# Patient Record
Sex: FEMALE | Race: ASIAN | NOT HISPANIC OR LATINO | Employment: UNEMPLOYED
[De-identification: names, ages, dates, MRNs, and addresses within clinical notes are randomized per-mention and may not be internally consistent; named-entity substitution may affect disease eponyms.]

---

## 2024-03-19 LAB
ABO + RH BLD: NORMAL
HBV SURFACE AG SER QL: NEGATIVE
HIV 1+2 AB+HIV1 P24 AG SERPL QL IA: NON REACTIVE
RPR SER QL: NON REACTIVE
RUBV IGG SERPL IA-ACNC: NORMAL

## 2024-05-03 ENCOUNTER — EXTERNAL LAB (OUTPATIENT)
Dept: HEALTH INFORMATION MANAGEMENT | Facility: OTHER | Age: 29
End: 2024-05-03

## 2024-05-03 ENCOUNTER — APPOINTMENT (OUTPATIENT)
Dept: LAB | Age: 29
End: 2024-05-03

## 2024-05-03 DIAGNOSIS — Z34.01 ENCOUNTER FOR SUPERVISION OF NORMAL FIRST PREGNANCY, FIRST TRIMESTER (CMD): Primary | ICD-10-CM

## 2024-05-03 LAB
BKR KIT TESTING DISCLAIMER STATEMENT: NORMAL
PANORAMA  PRENATAL SCREEN SUMMARY: NORMAL

## 2024-05-03 PROCEDURE — 36415 COLL VENOUS BLD VENIPUNCTURE: CPT | Performed by: CLINICAL MEDICAL LABORATORY

## 2024-05-31 ENCOUNTER — EXTERNAL LAB (OUTPATIENT)
Dept: HEALTH INFORMATION MANAGEMENT | Facility: OTHER | Age: 29
End: 2024-05-31

## 2024-05-31 LAB — LAB RESULT: NORMAL

## 2024-07-15 ENCOUNTER — CLINICAL ABSTRACT (OUTPATIENT)
Dept: MATERNAL FETAL MEDICINE | Age: 29
End: 2024-07-15

## 2024-07-15 DIAGNOSIS — Z36.89 ENCOUNTER FOR FETAL ANATOMIC SURVEY (CMD): Primary | ICD-10-CM

## 2024-07-15 DIAGNOSIS — Z36.89 ENCOUNTER FOR ULTRASOUND TO ASSESS FETAL GROWTH (CMD): ICD-10-CM

## 2024-08-06 ENCOUNTER — APPOINTMENT (OUTPATIENT)
Dept: MATERNAL FETAL MEDICINE | Age: 29
End: 2024-08-06
Attending: OBSTETRICS & GYNECOLOGY

## 2024-08-12 ENCOUNTER — OFFICE VISIT (OUTPATIENT)
Dept: MATERNAL FETAL MEDICINE | Age: 29
End: 2024-08-12
Attending: OBSTETRICS & GYNECOLOGY

## 2024-08-12 DIAGNOSIS — Z36.89 ENCOUNTER FOR ULTRASOUND TO ASSESS FETAL GROWTH (CMD): ICD-10-CM

## 2024-08-12 DIAGNOSIS — O35.BXX1 ECHOGENIC FOCUS OF HEART OF FETUS AFFECTING ANTEPARTUM CARE OF MOTHER, FETUS 1 OF MULTIPLE GESTATION (CMD): Primary | ICD-10-CM

## 2024-08-12 DIAGNOSIS — Z36.89 ENCOUNTER FOR FETAL ANATOMIC SURVEY (CMD): ICD-10-CM

## 2024-08-12 PROCEDURE — 76811 OB US DETAILED SNGL FETUS: CPT

## 2024-08-26 LAB
HIV 1+2 AB+HIV1 P24 AG SERPL QL IA: NON REACTIVE
RPR SER QL: NON REACTIVE

## 2024-10-19 ENCOUNTER — ANESTHESIA (OUTPATIENT)
Dept: OBGYN | Age: 29
End: 2024-10-19

## 2024-10-19 ENCOUNTER — HOSPITAL ENCOUNTER (INPATIENT)
Age: 29
LOS: 3 days | Discharge: HOME | End: 2024-10-22
Attending: OBSTETRICS & GYNECOLOGY | Admitting: OBSTETRICS & GYNECOLOGY

## 2024-10-19 ENCOUNTER — ANESTHESIA EVENT (OUTPATIENT)
Dept: OBGYN | Age: 29
End: 2024-10-19

## 2024-10-19 DIAGNOSIS — Z98.891 S/P C-SECTION: Primary | ICD-10-CM

## 2024-10-19 LAB
ABO + RH BLD: NORMAL
ALBUMIN SERPL-MCNC: 2.7 G/DL (ref 3.4–5)
ALBUMIN/GLOB SERPL: 0.7 {RATIO} (ref 1–2.4)
ALP SERPL-CCNC: 278 UNITS/L (ref 45–117)
ALT SERPL-CCNC: 108 UNITS/L
ANION GAP SERPL CALC-SCNC: 13 MMOL/L (ref 7–19)
AST SERPL-CCNC: 80 UNITS/L
BASOPHILS # BLD: 0 K/MCL (ref 0–0.3)
BASOPHILS NFR BLD: 1 %
BILIRUB SERPL-MCNC: 0.5 MG/DL (ref 0.2–1)
BLD GP AB SCN SERPL QL GEL: NEGATIVE
BUN SERPL-MCNC: 10 MG/DL (ref 6–20)
BUN/CREAT SERPL: 19 (ref 7–25)
CALCIUM SERPL-MCNC: 9.2 MG/DL (ref 8.4–10.2)
CHLORIDE SERPL-SCNC: 109 MMOL/L (ref 97–110)
CO2 SERPL-SCNC: 20 MMOL/L (ref 21–32)
CREAT SERPL-MCNC: 0.53 MG/DL (ref 0.51–0.95)
CREAT UR-MCNC: 30.1 MG/DL
DEPRECATED RDW RBC: 44.2 FL (ref 39–50)
EGFRCR SERPLBLD CKD-EPI 2021: >90 ML/MIN/{1.73_M2}
EOSINOPHIL # BLD: 0.1 K/MCL (ref 0–0.5)
EOSINOPHIL NFR BLD: 1 %
ERYTHROCYTE [DISTWIDTH] IN BLOOD: 16 % (ref 11–15)
FASTING DURATION TIME PATIENT: ABNORMAL H
FIBRINOGEN PPP-MCNC: 726 MG/DL (ref 190–425)
GLOBULIN SER-MCNC: 4 G/DL (ref 2–4)
GLUCOSE SERPL-MCNC: 95 MG/DL (ref 70–99)
HCT VFR BLD CALC: 40.6 % (ref 36–46.5)
HGB BLD-MCNC: 13.1 G/DL (ref 12–15.5)
IMM GRANULOCYTES # BLD AUTO: 0.1 K/MCL (ref 0–0.2)
IMM GRANULOCYTES # BLD: 1 %
LYMPHOCYTES # BLD: 2.5 K/MCL (ref 1–4.8)
LYMPHOCYTES NFR BLD: 28 %
MCH RBC QN AUTO: 24.9 PG (ref 26–34)
MCHC RBC AUTO-ENTMCNC: 32.3 G/DL (ref 32–36.5)
MCV RBC AUTO: 77.2 FL (ref 78–100)
MONOCYTES # BLD: 0.5 K/MCL (ref 0.3–0.9)
MONOCYTES NFR BLD: 6 %
NEUTROPHILS # BLD: 5.6 K/MCL (ref 1.8–7.7)
NEUTROPHILS NFR BLD: 63 %
NRBC BLD MANUAL-RTO: 0 /100 WBC
PLATELET # BLD AUTO: 303 K/MCL (ref 140–450)
POTASSIUM SERPL-SCNC: 4.3 MMOL/L (ref 3.4–5.1)
PROT SERPL-MCNC: 6.7 G/DL (ref 6.4–8.2)
PROT UR-MCNC: 12 MG/DL
PROT/CREAT UR: 399 MGPR/GCR
RBC # BLD: 5.26 MIL/MCL (ref 4–5.2)
RPR SER QL: NONREACTIVE
SODIUM SERPL-SCNC: 138 MMOL/L (ref 135–145)
TYPE AND SCREEN EXPIRATION DATE: NORMAL
WBC # BLD: 8.8 K/MCL (ref 4.2–11)

## 2024-10-19 PROCEDURE — 10006023 HB SUPPLY 272: Performed by: OBSTETRICS & GYNECOLOGY

## 2024-10-19 PROCEDURE — 10002807 HB RX 258

## 2024-10-19 PROCEDURE — 13000141 HB OB COMPLEX CASE EACH ADD MINUTE: Performed by: OBSTETRICS & GYNECOLOGY

## 2024-10-19 PROCEDURE — 86900 BLOOD TYPING SEROLOGIC ABO: CPT | Performed by: OBSTETRICS & GYNECOLOGY

## 2024-10-19 PROCEDURE — 13003286 HB ROOM CHARGE L&D

## 2024-10-19 PROCEDURE — 10002803 HB RX 637

## 2024-10-19 PROCEDURE — 13000140 HB OB COMPLEX  CASE S/U + 1ST 15 MIN: Performed by: OBSTETRICS & GYNECOLOGY

## 2024-10-19 PROCEDURE — 10002801 HB RX 250 W/O HCPCS: Performed by: OBSTETRICS & GYNECOLOGY

## 2024-10-19 PROCEDURE — 36415 COLL VENOUS BLD VENIPUNCTURE: CPT | Performed by: OBSTETRICS & GYNECOLOGY

## 2024-10-19 PROCEDURE — 82570 ASSAY OF URINE CREATININE: CPT | Performed by: OBSTETRICS & GYNECOLOGY

## 2024-10-19 PROCEDURE — 85025 COMPLETE CBC W/AUTO DIFF WBC: CPT | Performed by: OBSTETRICS & GYNECOLOGY

## 2024-10-19 PROCEDURE — 10002801 HB RX 250 W/O HCPCS

## 2024-10-19 PROCEDURE — 86592 SYPHILIS TEST NON-TREP QUAL: CPT | Performed by: OBSTETRICS & GYNECOLOGY

## 2024-10-19 PROCEDURE — 96372 THER/PROPH/DIAG INJ SC/IM: CPT | Performed by: OBSTETRICS & GYNECOLOGY

## 2024-10-19 PROCEDURE — 13001455 HB PERINATAL CARE HIGH RISK

## 2024-10-19 PROCEDURE — 80053 COMPREHEN METABOLIC PANEL: CPT | Performed by: OBSTETRICS & GYNECOLOGY

## 2024-10-19 PROCEDURE — 85384 FIBRINOGEN ACTIVITY: CPT | Performed by: OBSTETRICS & GYNECOLOGY

## 2024-10-19 PROCEDURE — 10002807 HB RX 258: Performed by: OBSTETRICS & GYNECOLOGY

## 2024-10-19 PROCEDURE — 10002800 HB RX 250 W HCPCS: Performed by: OBSTETRICS & GYNECOLOGY

## 2024-10-19 PROCEDURE — 13000012 HB ANESTHESIA SPINAL/EPIDURAL IN L&D: Performed by: OBSTETRICS & GYNECOLOGY

## 2024-10-19 PROCEDURE — 10002800 HB RX 250 W HCPCS

## 2024-10-19 PROCEDURE — 13000001 HB PHASE II RECOVERY EA 30 MINUTES: Performed by: OBSTETRICS & GYNECOLOGY

## 2024-10-19 RX ORDER — 0.9 % SODIUM CHLORIDE 0.9 %
2 VIAL (ML) INJECTION EVERY 12 HOURS SCHEDULED
Status: CANCELLED | OUTPATIENT
Start: 2024-10-20

## 2024-10-19 RX ORDER — HEPARIN SOD,PORK IN 0.45% NACL 25000/500
INTRAVENOUS SOLUTION INTRAVENOUS CONTINUOUS
Status: CANCELLED | OUTPATIENT
Start: 2024-10-19

## 2024-10-19 RX ORDER — OXYTOCIN/0.9 % SODIUM CHLORIDE 30/500 ML
0-20 PLASTIC BAG, INJECTION (ML) INTRAVENOUS CONTINUOUS
Status: DISCONTINUED | OUTPATIENT
Start: 2024-10-19 | End: 2024-10-20

## 2024-10-19 RX ORDER — METHYLERGONOVINE MALEATE 0.2 MG/ML
200 INJECTION INTRAVENOUS
Status: DISCONTINUED | OUTPATIENT
Start: 2024-10-19 | End: 2024-10-20

## 2024-10-19 RX ORDER — CARBOPROST TROMETHAMINE 250 UG/ML
250 INJECTION, SOLUTION INTRAMUSCULAR PRN
Status: DISCONTINUED | OUTPATIENT
Start: 2024-10-19 | End: 2024-10-20

## 2024-10-19 RX ORDER — HEPARIN SOD,PORK IN 0.45% NACL 25000/500
INTRAVENOUS SOLUTION INTRAVENOUS CONTINUOUS
Status: DISCONTINUED | OUTPATIENT
Start: 2024-10-19 | End: 2024-10-20

## 2024-10-19 RX ORDER — BUPIVACAINE HYDROCHLORIDE 2.5 MG/ML
INJECTION, SOLUTION EPIDURAL; INFILTRATION; INTRACAUDAL
Status: COMPLETED | OUTPATIENT
Start: 2024-10-19 | End: 2024-10-19

## 2024-10-19 RX ORDER — EPHEDRINE SULFATE/0.9% NACL/PF 50 MG/10ML
10 SYRINGE (ML) INTRAVENOUS
Status: DISCONTINUED | OUTPATIENT
Start: 2024-10-19 | End: 2024-10-20

## 2024-10-19 RX ORDER — CALCIUM CARBONATE 500 MG/1
500 TABLET, CHEWABLE ORAL EVERY 4 HOURS PRN
Status: DISCONTINUED | OUTPATIENT
Start: 2024-10-19 | End: 2024-10-20

## 2024-10-19 RX ORDER — MORPHINE SULFATE 1 MG/ML
INJECTION, SOLUTION EPIDURAL; INTRATHECAL; INTRAVENOUS PRN
Status: DISCONTINUED | OUTPATIENT
Start: 2024-10-19 | End: 2024-10-19

## 2024-10-19 RX ORDER — LIDOCAINE HYDROCHLORIDE 10 MG/ML
30 INJECTION, SOLUTION EPIDURAL; INFILTRATION; INTRACAUDAL; PERINEURAL
Status: DISCONTINUED | OUTPATIENT
Start: 2024-10-19 | End: 2024-10-20

## 2024-10-19 RX ORDER — HEPARIN SOD,PORK IN 0.45% NACL 25000/500
INTRAVENOUS SOLUTION INTRAVENOUS
Status: COMPLETED
Start: 2024-10-19 | End: 2024-10-19

## 2024-10-19 RX ORDER — BUPIVACAINE HYDROCHLORIDE 2.5 MG/ML
30 INJECTION, SOLUTION EPIDURAL; INFILTRATION; INTRACAUDAL ONCE
Status: CANCELLED | OUTPATIENT
Start: 2024-10-19 | End: 2024-10-19

## 2024-10-19 RX ORDER — OXYTOCIN-SODIUM CHLORIDE 0.9% IV SOLN 30 UNIT/500ML 30-0.9/5 UT/ML-%
0-334 SOLUTION INTRAVENOUS CONTINUOUS
Status: DISCONTINUED | OUTPATIENT
Start: 2024-10-19 | End: 2024-10-20

## 2024-10-19 RX ORDER — NALOXONE HCL 0.4 MG/ML
0.1 VIAL (ML) INJECTION PRN
Status: CANCELLED | OUTPATIENT
Start: 2024-10-19 | End: 2024-10-20

## 2024-10-19 RX ORDER — MISOPROSTOL 200 UG/1
800 TABLET ORAL
Status: DISCONTINUED | OUTPATIENT
Start: 2024-10-19 | End: 2024-10-20

## 2024-10-19 RX ORDER — EPHEDRINE SULFATE/0.9% NACL/PF 50 MG/10ML
5 SYRINGE (ML) INTRAVENOUS
Status: CANCELLED | OUTPATIENT
Start: 2024-10-19

## 2024-10-19 RX ORDER — SODIUM CHLORIDE, SODIUM LACTATE, POTASSIUM CHLORIDE, CALCIUM CHLORIDE 600; 310; 30; 20 MG/100ML; MG/100ML; MG/100ML; MG/100ML
INJECTION, SOLUTION INTRAVENOUS CONTINUOUS PRN
Status: DISCONTINUED | OUTPATIENT
Start: 2024-10-19 | End: 2024-10-19

## 2024-10-19 RX ORDER — CEFAZOLIN 2 G/1
INJECTION, POWDER, FOR SOLUTION INTRAMUSCULAR; INTRAVENOUS
Status: COMPLETED
Start: 2024-10-19 | End: 2024-10-19

## 2024-10-19 RX ORDER — ONDANSETRON 4 MG/1
4 TABLET, ORALLY DISINTEGRATING ORAL EVERY 12 HOURS PRN
Status: CANCELLED | OUTPATIENT
Start: 2024-10-19

## 2024-10-19 RX ORDER — 0.9 % SODIUM CHLORIDE 0.9 %
10 VIAL (ML) INJECTION PRN
Status: CANCELLED | OUTPATIENT
Start: 2024-10-19

## 2024-10-19 RX ORDER — LIDOCAINE HYDROCHLORIDE AND EPINEPHRINE 15; 5 MG/ML; UG/ML
INJECTION, SOLUTION EPIDURAL PRN
Status: DISCONTINUED | OUTPATIENT
Start: 2024-10-19 | End: 2024-10-19

## 2024-10-19 RX ORDER — ONDANSETRON 2 MG/ML
4 INJECTION INTRAMUSCULAR; INTRAVENOUS 2 TIMES DAILY PRN
Status: DISCONTINUED | OUTPATIENT
Start: 2024-10-19 | End: 2024-10-20

## 2024-10-19 RX ORDER — SODIUM CHLORIDE, SODIUM LACTATE, POTASSIUM CHLORIDE, CALCIUM CHLORIDE 600; 310; 30; 20 MG/100ML; MG/100ML; MG/100ML; MG/100ML
INJECTION, SOLUTION INTRAVENOUS CONTINUOUS
Status: DISCONTINUED | OUTPATIENT
Start: 2024-10-19 | End: 2024-10-20

## 2024-10-19 RX ORDER — BETAMETHASONE SODIUM PHOSPHATE AND BETAMETHASONE ACETATE 3; 3 MG/ML; MG/ML
12 INJECTION, SUSPENSION INTRA-ARTICULAR; INTRALESIONAL; INTRAMUSCULAR; SOFT TISSUE EVERY 24 HOURS
Status: DISCONTINUED | OUTPATIENT
Start: 2024-10-19 | End: 2024-10-20

## 2024-10-19 RX ORDER — TRANEXAMIC ACID 10 MG/ML
1000 INJECTION, SOLUTION INTRAVENOUS EVERY 30 MIN PRN
Status: DISCONTINUED | OUTPATIENT
Start: 2024-10-19 | End: 2024-10-20

## 2024-10-19 RX ORDER — OXYTOCIN 10 [USP'U]/ML
10 INJECTION, SOLUTION INTRAMUSCULAR; INTRAVENOUS
Status: DISCONTINUED | OUTPATIENT
Start: 2024-10-19 | End: 2024-10-20

## 2024-10-19 RX ORDER — ONDANSETRON 2 MG/ML
4 INJECTION INTRAMUSCULAR; INTRAVENOUS EVERY 12 HOURS PRN
Status: CANCELLED | OUTPATIENT
Start: 2024-10-19

## 2024-10-19 RX ORDER — EPHEDRINE SULFATE/0.9% NACL/PF 50 MG/10ML
10 SYRINGE (ML) INTRAVENOUS
Status: CANCELLED | OUTPATIENT
Start: 2024-10-19

## 2024-10-19 RX ORDER — ONDANSETRON 2 MG/ML
INJECTION INTRAMUSCULAR; INTRAVENOUS PRN
Status: DISCONTINUED | OUTPATIENT
Start: 2024-10-19 | End: 2024-10-19

## 2024-10-19 RX ORDER — EPHEDRINE SULFATE/0.9% NACL/PF 50 MG/10ML
5 SYRINGE (ML) INTRAVENOUS
Status: DISCONTINUED | OUTPATIENT
Start: 2024-10-19 | End: 2024-10-20

## 2024-10-19 RX ORDER — DIPHENHYDRAMINE HYDROCHLORIDE 50 MG/ML
25 INJECTION INTRAMUSCULAR; INTRAVENOUS EVERY 4 HOURS PRN
Status: DISCONTINUED | OUTPATIENT
Start: 2024-10-19 | End: 2024-10-20

## 2024-10-19 RX ORDER — PANTOPRAZOLE SODIUM 40 MG/1
40 TABLET, DELAYED RELEASE ORAL DAILY
Status: ON HOLD | COMMUNITY
End: 2024-10-22 | Stop reason: HOSPADM

## 2024-10-19 RX ORDER — LANOLIN ALCOHOL/MO/W.PET/CERES
1000 CREAM (GRAM) TOPICAL DAILY
COMMUNITY

## 2024-10-19 RX ORDER — BUPIVACAINE HYDROCHLORIDE 2.5 MG/ML
30 INJECTION, SOLUTION EPIDURAL; INFILTRATION; INTRACAUDAL ONCE
Status: DISCONTINUED | OUTPATIENT
Start: 2024-10-19 | End: 2024-10-20

## 2024-10-19 RX ORDER — FAMOTIDINE 20 MG/1
20 TABLET, FILM COATED ORAL 2 TIMES DAILY
Status: ON HOLD | COMMUNITY
End: 2024-10-22 | Stop reason: HOSPADM

## 2024-10-19 RX ORDER — NITROGLYCERIN 400 UG/1
SPRAY ORAL
Status: DISCONTINUED
Start: 2024-10-19 | End: 2024-10-19 | Stop reason: WASHOUT

## 2024-10-19 RX ORDER — VITAMIN A ACETATE, BETA CAROTENE, ASCORBIC ACID, CHOLECALCIFEROL, .ALPHA.-TOCOPHEROL ACETATE, DL-, THIAMINE MONONITRATE, RIBOFLAVIN, NIACINAMIDE, PYRIDOXINE HYDROCHLORIDE, FOLIC ACID, CYANOCOBALAMIN, CALCIUM CARBONATE, FERROUS FUMARATE, ZINC OXIDE, CUPRIC OXIDE 3080; 12; 120; 400; 1; 1.84; 3; 20; 22; 920; 25; 200; 27; 10; 2 [IU]/1; UG/1; MG/1; [IU]/1; MG/1; MG/1; MG/1; MG/1; MG/1; [IU]/1; MG/1; MG/1; MG/1; MG/1; MG/1
1 TABLET, FILM COATED ORAL DAILY
COMMUNITY

## 2024-10-19 RX ADMIN — ONDANSETRON 4 MG: 2 INJECTION INTRAMUSCULAR; INTRAVENOUS at 22:54

## 2024-10-19 RX ADMIN — BUPIVACAINE HYDROCHLORIDE 10 ML: 2.5 INJECTION, SOLUTION EPIDURAL; INFILTRATION; INTRACAUDAL at 08:18

## 2024-10-19 RX ADMIN — Medication 10 ML/HR: at 08:25

## 2024-10-19 RX ADMIN — OXYTOCIN 334 MILLI-UNITS/MIN: 10 INJECTION, SOLUTION INTRAMUSCULAR; INTRAVENOUS at 22:57

## 2024-10-19 RX ADMIN — Medication 200 MCG: at 17:02

## 2024-10-19 RX ADMIN — FENTANYL CITRATE 100 MCG: 50 INJECTION INTRAMUSCULAR; INTRAVENOUS at 08:22

## 2024-10-19 RX ADMIN — AMPICILLIN 1000 MG: 1 INJECTION, POWDER, FOR SOLUTION INTRAMUSCULAR; INTRAVENOUS at 21:05

## 2024-10-19 RX ADMIN — SODIUM CHLORIDE, POTASSIUM CHLORIDE, SODIUM LACTATE AND CALCIUM CHLORIDE: 600; 310; 30; 20 INJECTION, SOLUTION INTRAVENOUS at 03:58

## 2024-10-19 RX ADMIN — Medication 2 MILLI-UNITS/MIN: at 05:37

## 2024-10-19 RX ADMIN — AMPICILLIN 2000 MG: 2 INJECTION, POWDER, FOR SOLUTION INTRAMUSCULAR; INTRAVENOUS at 04:00

## 2024-10-19 RX ADMIN — MORPHINE SULFATE 3.5 MG: 1 INJECTION, SOLUTION EPIDURAL; INTRATHECAL; INTRAVENOUS at 23:10

## 2024-10-19 RX ADMIN — AMPICILLIN 1000 MG: 1 INJECTION, POWDER, FOR SOLUTION INTRAMUSCULAR; INTRAVENOUS at 08:40

## 2024-10-19 RX ADMIN — FENTANYL CITRATE 50 MCG: 50 INJECTION INTRAMUSCULAR; INTRAVENOUS at 22:43

## 2024-10-19 RX ADMIN — AMPICILLIN 1000 MG: 1 INJECTION, POWDER, FOR SOLUTION INTRAMUSCULAR; INTRAVENOUS at 17:02

## 2024-10-19 RX ADMIN — HYDROMORPHONE HYDROCHLORIDE 0.2 MG: 1 INJECTION, SOLUTION INTRAMUSCULAR; INTRAVENOUS; SUBCUTANEOUS at 06:50

## 2024-10-19 RX ADMIN — BETAMETHASONE SODIUM PHOSPHATE AND BETAMETHASONE ACETATE 12 MG: 3; 3 INJECTION, SUSPENSION INTRA-ARTICULAR; INTRALESIONAL; INTRAMUSCULAR at 04:01

## 2024-10-19 RX ADMIN — LIDOCAINE HYDROCHLORIDE,EPINEPHRINE BITARTRATE 8 ML: 15; .005 INJECTION, SOLUTION EPIDURAL; INFILTRATION; INTRACAUDAL; PERINEURAL at 22:45

## 2024-10-19 RX ADMIN — SODIUM CHLORIDE, SODIUM LACTATE, POTASSIUM CHLORIDE, AND CALCIUM CHLORIDE: .6; .31; .03; .02 INJECTION, SOLUTION INTRAVENOUS at 22:37

## 2024-10-19 RX ADMIN — SODIUM CHLORIDE, POTASSIUM CHLORIDE, SODIUM LACTATE AND CALCIUM CHLORIDE: 600; 310; 30; 20 INJECTION, SOLUTION INTRAVENOUS at 21:46

## 2024-10-19 RX ADMIN — WATER 2000 MG: 1 INJECTION INTRAMUSCULAR; INTRAVENOUS; SUBCUTANEOUS at 22:26

## 2024-10-19 RX ADMIN — LIDOCAINE HYDROCHLORIDE,EPINEPHRINE BITARTRATE 10 ML: 15; .005 INJECTION, SOLUTION EPIDURAL; INFILTRATION; INTRACAUDAL; PERINEURAL at 22:41

## 2024-10-19 RX ADMIN — AZITHROMYCIN MONOHYDRATE 500 MG: 500 INJECTION, POWDER, LYOPHILIZED, FOR SOLUTION INTRAVENOUS at 22:31

## 2024-10-19 RX ADMIN — SODIUM CHLORIDE, POTASSIUM CHLORIDE, SODIUM LACTATE AND CALCIUM CHLORIDE: 600; 310; 30; 20 INJECTION, SOLUTION INTRAVENOUS at 08:38

## 2024-10-19 RX ADMIN — AMPICILLIN 1000 MG: 1 INJECTION, POWDER, FOR SOLUTION INTRAMUSCULAR; INTRAVENOUS at 13:05

## 2024-10-19 SDOH — ECONOMIC STABILITY: TRANSPORTATION INSECURITY
IN THE PAST 12 MONTHS, HAS LACK OF RELIABLE TRANSPORTATION KEPT YOU FROM MEDICAL APPOINTMENTS, MEETINGS, WORK OR FROM GETTING THINGS NEEDED FOR DAILY LIVING?: NO

## 2024-10-19 SDOH — ECONOMIC STABILITY: HOUSING INSECURITY: WHAT IS YOUR LIVING SITUATION TODAY?: I HAVE A STEADY PLACE TO LIVE

## 2024-10-19 SDOH — SOCIAL STABILITY: SOCIAL INSECURITY: HOW OFTEN DOES ANYONE, INCLUDING FAMILY AND FRIENDS, PHYSICALLY HURT YOU?: NEVER

## 2024-10-19 SDOH — SOCIAL STABILITY: SOCIAL INSECURITY: HOW OFTEN DOES ANYONE, INCLUDING FAMILY AND FRIENDS, THREATEN YOU WITH HARM?: NEVER

## 2024-10-19 SDOH — SOCIAL STABILITY: SOCIAL NETWORK: SUPPORT SYSTEMS: FAMILY MEMBERS;FRIENDS

## 2024-10-19 SDOH — HEALTH STABILITY: PHYSICAL HEALTH: DO YOU HAVE DIFFICULTY DRESSING OR BATHING?: NO

## 2024-10-19 SDOH — HEALTH STABILITY: PHYSICAL HEALTH: DO YOU HAVE SERIOUS DIFFICULTY WALKING OR CLIMBING STAIRS?: NO

## 2024-10-19 SDOH — HEALTH STABILITY: GENERAL: BECAUSE OF A PHYSICAL, MENTAL, OR EMOTIONAL CONDITION, DO YOU HAVE DIFFICULTY DOING ERRANDS ALONE?: NO

## 2024-10-19 SDOH — ECONOMIC STABILITY: FOOD INSECURITY: WITHIN THE PAST 12 MONTHS, THE FOOD YOU BOUGHT JUST DIDN'T LAST AND YOU DIDN'T HAVE MONEY TO GET MORE.: NEVER TRUE

## 2024-10-19 SDOH — ECONOMIC STABILITY: INCOME INSECURITY: IN THE PAST 12 MONTHS, HAS THE ELECTRIC, GAS, OIL, OR WATER COMPANY THREATENED TO SHUT OFF SERVICE IN YOUR HOME?: NO

## 2024-10-19 SDOH — HEALTH STABILITY: GENERAL
BECAUSE OF A PHYSICAL, MENTAL, OR EMOTIONAL CONDITION, DO YOU HAVE SERIOUS DIFFICULTY CONCENTRATING, REMEMBERING OR MAKING DECISIONS?: NO

## 2024-10-19 SDOH — SOCIAL STABILITY: SOCIAL NETWORK
HOW OFTEN DO YOU SEE OR TALK TO PEOPLE THAT YOU CARE ABOUT AND FEEL CLOSE TO? (FOR EXAMPLE: TALKING TO FRIENDS ON THE PHONE, VISITING FRIENDS OR FAMILY, GOING TO CHURCH OR CLUB MEETINGS): 5 OR MORE TIMES A WEEK

## 2024-10-19 SDOH — ECONOMIC STABILITY: HOUSING INSECURITY: DO YOU HAVE PROBLEMS WITH ANY OF THE FOLLOWING?: NONE OF THE ABOVE

## 2024-10-19 SDOH — SOCIAL STABILITY: SOCIAL INSECURITY: HOW OFTEN DOES ANYONE, INCLUDING FAMILY AND FRIENDS, INSULT OR TALK DOWN TO YOU?: NEVER

## 2024-10-19 SDOH — SOCIAL STABILITY: SOCIAL INSECURITY: HOW OFTEN DOES ANYONE, INCLUDING FAMILY AND FRIENDS, SCREAM OR CURSE AT YOU?: NEVER

## 2024-10-19 SDOH — ECONOMIC STABILITY: HOUSING INSECURITY: WHAT IS YOUR LIVING SITUATION TODAY?: HOUSE

## 2024-10-19 SDOH — ECONOMIC STABILITY: GENERAL

## 2024-10-19 SDOH — ECONOMIC STABILITY: HOUSING INSECURITY: WHAT IS YOUR LIVING SITUATION TODAY?: SPOUSE

## 2024-10-19 SDOH — ECONOMIC STABILITY: GENERAL: WOULD YOU LIKE HELP WITH ANY OF THE FOLLOWING NEEDS?: I DON'T WANT HELP WITH ANY OF THESE

## 2024-10-19 ASSESSMENT — PATIENT HEALTH QUESTIONNAIRE - PHQ9
CLINICAL INTERPRETATION OF PHQ2 SCORE: NO FURTHER SCREENING NEEDED
SUM OF ALL RESPONSES TO PHQ9 QUESTIONS 1 AND 2: 0
IS PATIENT ABLE TO COMPLETE PHQ2 OR PHQ9: YES

## 2024-10-19 ASSESSMENT — PAIN SCALES - GENERAL: PAINLEVEL_OUTOF10: 0

## 2024-10-19 ASSESSMENT — LIFESTYLE VARIABLES
HOW OFTEN DO YOU HAVE A DRINK CONTAINING ALCOHOL: NEVER
ALCOHOL_USE_STATUS: NO OR LOW RISK WITH VALIDATED TOOL
HOW MANY STANDARD DRINKS CONTAINING ALCOHOL DO YOU HAVE ON A TYPICAL DAY: 0,1 OR 2
HOW OFTEN DO YOU HAVE 6 OR MORE DRINKS ON ONE OCCASION: NEVER
AUDIT-C TOTAL SCORE: 0

## 2024-10-19 ASSESSMENT — ACTIVITIES OF DAILY LIVING (ADL)
ADL_SHORT_OF_BREATH: NO
ADL_SCORE: 12
ADL_BEFORE_ADMISSION: INDEPENDENT
RECENT_DECLINE_ADL: NO

## 2024-10-20 LAB
ALBUMIN SERPL-MCNC: 1.9 G/DL (ref 3.4–5)
ALBUMIN/GLOB SERPL: 0.5 {RATIO} (ref 1–2.4)
ALP SERPL-CCNC: 196 UNITS/L (ref 45–117)
ALT SERPL-CCNC: 121 UNITS/L
ANION GAP SERPL CALC-SCNC: 11 MMOL/L (ref 7–19)
AST SERPL-CCNC: 95 UNITS/L
BILIRUB SERPL-MCNC: 0.9 MG/DL (ref 0.2–1)
BUN SERPL-MCNC: 6 MG/DL (ref 6–20)
BUN/CREAT SERPL: 9 (ref 7–25)
CALCIUM SERPL-MCNC: 9.1 MG/DL (ref 8.4–10.2)
CHLORIDE SERPL-SCNC: 109 MMOL/L (ref 97–110)
CO2 SERPL-SCNC: 22 MMOL/L (ref 21–32)
CREAT SERPL-MCNC: 0.64 MG/DL (ref 0.51–0.95)
DEPRECATED RDW RBC: 46.2 FL (ref 39–50)
EGFRCR SERPLBLD CKD-EPI 2021: >90 ML/MIN/{1.73_M2}
ERYTHROCYTE [DISTWIDTH] IN BLOOD: 16.3 % (ref 11–15)
FASTING DURATION TIME PATIENT: ABNORMAL H
GLOBULIN SER-MCNC: 3.6 G/DL (ref 2–4)
GLUCOSE SERPL-MCNC: 107 MG/DL (ref 70–99)
HCT VFR BLD CALC: 35.9 % (ref 36–46.5)
HGB BLD-MCNC: 11.5 G/DL (ref 12–15.5)
MCH RBC QN AUTO: 25.6 PG (ref 26–34)
MCHC RBC AUTO-ENTMCNC: 32 G/DL (ref 32–36.5)
MCV RBC AUTO: 79.8 FL (ref 78–100)
NRBC BLD MANUAL-RTO: 0 /100 WBC
PLATELET # BLD AUTO: 249 K/MCL (ref 140–450)
POTASSIUM SERPL-SCNC: 3.8 MMOL/L (ref 3.4–5.1)
PROT SERPL-MCNC: 5.5 G/DL (ref 6.4–8.2)
RBC # BLD: 4.5 MIL/MCL (ref 4–5.2)
SODIUM SERPL-SCNC: 138 MMOL/L (ref 135–145)
WBC # BLD: 15.8 K/MCL (ref 4.2–11)

## 2024-10-20 PROCEDURE — 85027 COMPLETE CBC AUTOMATED: CPT | Performed by: OBSTETRICS & GYNECOLOGY

## 2024-10-20 PROCEDURE — S9445 PT EDUCATION NOC INDIVID: HCPCS

## 2024-10-20 PROCEDURE — 10002800 HB RX 250 W HCPCS: Performed by: OBSTETRICS & GYNECOLOGY

## 2024-10-20 PROCEDURE — 13001455 HB PERINATAL CARE HIGH RISK

## 2024-10-20 PROCEDURE — 10000003 HB ROOM CHARGE WOMEN'S HEALTH

## 2024-10-20 PROCEDURE — 36415 COLL VENOUS BLD VENIPUNCTURE: CPT | Performed by: OBSTETRICS & GYNECOLOGY

## 2024-10-20 PROCEDURE — 10002803 HB RX 637: Performed by: OBSTETRICS & GYNECOLOGY

## 2024-10-20 PROCEDURE — 10002807 HB RX 258: Performed by: OBSTETRICS & GYNECOLOGY

## 2024-10-20 PROCEDURE — 10004651 HB RX, NO CHARGE ITEM: Performed by: OBSTETRICS & GYNECOLOGY

## 2024-10-20 PROCEDURE — 80053 COMPREHEN METABOLIC PANEL: CPT | Performed by: OBSTETRICS & GYNECOLOGY

## 2024-10-20 RX ORDER — 0.9 % SODIUM CHLORIDE 0.9 %
2 VIAL (ML) INJECTION EVERY 12 HOURS SCHEDULED
Status: DISCONTINUED | OUTPATIENT
Start: 2024-10-20 | End: 2024-10-21

## 2024-10-20 RX ORDER — ONDANSETRON 2 MG/ML
4 INJECTION INTRAMUSCULAR; INTRAVENOUS 2 TIMES DAILY PRN
Status: DISCONTINUED | OUTPATIENT
Start: 2024-10-20 | End: 2024-10-21

## 2024-10-20 RX ORDER — SIMETHICONE 125 MG
125 TABLET,CHEWABLE ORAL 4 TIMES DAILY PRN
Status: DISCONTINUED | OUTPATIENT
Start: 2024-10-20 | End: 2024-10-22 | Stop reason: HOSPADM

## 2024-10-20 RX ORDER — OXYCODONE HYDROCHLORIDE 5 MG/1
5 TABLET ORAL EVERY 4 HOURS PRN
Status: DISCONTINUED | OUTPATIENT
Start: 2024-10-20 | End: 2024-10-22 | Stop reason: HOSPADM

## 2024-10-20 RX ORDER — OXYTOCIN-SODIUM CHLORIDE 0.9% IV SOLN 30 UNIT/500ML 30-0.9/5 UT/ML-%
0-334 SOLUTION INTRAVENOUS CONTINUOUS
Status: DISCONTINUED | OUTPATIENT
Start: 2024-10-20 | End: 2024-10-20 | Stop reason: CLARIF

## 2024-10-20 RX ORDER — SODIUM CHLORIDE, SODIUM LACTATE, POTASSIUM CHLORIDE, CALCIUM CHLORIDE 600; 310; 30; 20 MG/100ML; MG/100ML; MG/100ML; MG/100ML
INJECTION, SOLUTION INTRAVENOUS CONTINUOUS
Status: DISCONTINUED | OUTPATIENT
Start: 2024-10-20 | End: 2024-10-21

## 2024-10-20 RX ORDER — POLYETHYLENE GLYCOL 3350 17 G/17G
17 POWDER, FOR SOLUTION ORAL DAILY PRN
Status: DISCONTINUED | OUTPATIENT
Start: 2024-10-20 | End: 2024-10-22 | Stop reason: HOSPADM

## 2024-10-20 RX ORDER — CALCIUM CARBONATE 500 MG/1
500 TABLET, CHEWABLE ORAL EVERY 4 HOURS PRN
Status: DISCONTINUED | OUTPATIENT
Start: 2024-10-20 | End: 2024-10-22 | Stop reason: HOSPADM

## 2024-10-20 RX ORDER — ACETAMINOPHEN 500 MG
1000 TABLET ORAL EVERY 8 HOURS
Status: DISCONTINUED | OUTPATIENT
Start: 2024-10-20 | End: 2024-10-22 | Stop reason: HOSPADM

## 2024-10-20 RX ORDER — IBUPROFEN 600 MG/1
600 TABLET, FILM COATED ORAL EVERY 6 HOURS
Status: DISCONTINUED | OUTPATIENT
Start: 2024-10-20 | End: 2024-10-22 | Stop reason: HOSPADM

## 2024-10-20 RX ORDER — KETOROLAC TROMETHAMINE 30 MG/ML
15 INJECTION, SOLUTION INTRAMUSCULAR; INTRAVENOUS EVERY 6 HOURS
Status: DISPENSED | OUTPATIENT
Start: 2024-10-20 | End: 2024-10-20

## 2024-10-20 RX ORDER — AMOXICILLIN 250 MG
2 CAPSULE ORAL DAILY PRN
Status: DISCONTINUED | OUTPATIENT
Start: 2024-10-20 | End: 2024-10-22 | Stop reason: HOSPADM

## 2024-10-20 RX ADMIN — ACETAMINOPHEN 1000 MG: 500 TABLET ORAL at 07:31

## 2024-10-20 RX ADMIN — SODIUM CHLORIDE, PRESERVATIVE FREE 2 ML: 5 INJECTION INTRAVENOUS at 12:03

## 2024-10-20 RX ADMIN — SODIUM CHLORIDE, POTASSIUM CHLORIDE, SODIUM LACTATE AND CALCIUM CHLORIDE: 600; 310; 30; 20 INJECTION, SOLUTION INTRAVENOUS at 02:52

## 2024-10-20 RX ADMIN — IBUPROFEN 600 MG: 600 TABLET, FILM COATED ORAL at 17:50

## 2024-10-20 RX ADMIN — KETOROLAC TROMETHAMINE 15 MG: 30 INJECTION, SOLUTION INTRAMUSCULAR at 05:35

## 2024-10-20 RX ADMIN — ACETAMINOPHEN 1000 MG: 500 TABLET ORAL at 17:50

## 2024-10-20 RX ADMIN — KETOROLAC TROMETHAMINE 15 MG: 30 INJECTION, SOLUTION INTRAMUSCULAR at 12:02

## 2024-10-20 ASSESSMENT — PAIN SCALES - GENERAL
PAINLEVEL_OUTOF10: 0
PAINLEVEL_OUTOF10: 2
PAINLEVEL_OUTOF10: 4
PAINLEVEL_OUTOF10: 3
PAINLEVEL_OUTOF10: 0
PAINLEVEL_OUTOF10: 0

## 2024-10-21 PROCEDURE — 10000003 HB ROOM CHARGE WOMEN'S HEALTH

## 2024-10-21 PROCEDURE — S9445 PT EDUCATION NOC INDIVID: HCPCS

## 2024-10-21 PROCEDURE — 10002803 HB RX 637: Performed by: OBSTETRICS & GYNECOLOGY

## 2024-10-21 PROCEDURE — 10004651 HB RX, NO CHARGE ITEM: Performed by: OBSTETRICS & GYNECOLOGY

## 2024-10-21 RX ADMIN — IBUPROFEN 600 MG: 600 TABLET, FILM COATED ORAL at 16:12

## 2024-10-21 RX ADMIN — SENNOSIDES AND DOCUSATE SODIUM 2 TABLET: 50; 8.6 TABLET ORAL at 07:48

## 2024-10-21 RX ADMIN — IBUPROFEN 600 MG: 600 TABLET, FILM COATED ORAL at 02:56

## 2024-10-21 RX ADMIN — OXYCODONE HYDROCHLORIDE 5 MG: 5 TABLET ORAL at 07:45

## 2024-10-21 RX ADMIN — ACETAMINOPHEN 1000 MG: 500 TABLET ORAL at 02:56

## 2024-10-21 RX ADMIN — IBUPROFEN 600 MG: 600 TABLET, FILM COATED ORAL at 22:25

## 2024-10-21 RX ADMIN — ACETAMINOPHEN 1000 MG: 500 TABLET ORAL at 10:58

## 2024-10-21 RX ADMIN — IBUPROFEN 600 MG: 600 TABLET, FILM COATED ORAL at 09:47

## 2024-10-21 RX ADMIN — ACETAMINOPHEN 1000 MG: 500 TABLET ORAL at 20:45

## 2024-10-21 ASSESSMENT — EDINBURGH POSTNATAL DEPRESSION SCALE (EPDS)
I HAVE BEEN SO UNHAPPY THAT I HAVE HAD DIFFICULTY SLEEPING: NOT VERY OFTEN
THE THOUGHT OF HARMING MYSELF HAS OCCURRED TO ME: NEVER
I HAVE BEEN ABLE TO LAUGH AND SEE THE FUNNY SIDE OF THINGS: AS MUCH AS I ALWAYS COULD
I HAVE FELT SCARED OR PANICKY FOR NO GOOD REASON: NO, NOT AT ALL
I HAVE FELT SAD OR MISERABLE: NO, NOT AT ALL
I HAVE BEEN ANXIOUS OR WORRIED FOR NO GOOD REASON: HARDLY EVER
I HAVE BLAMED MYSELF UNNECESSARILY WHEN THINGS WENT WRONG: NOT VERY OFTEN
TOTAL SCORE: 3
I HAVE BEEN SO UNHAPPY THAT I HAVE BEEN CRYING: NO, NEVER
I HAVE LOOKED FORWARD WITH ENJOYMENT TO THINGS: AS MUCH AS I EVER DID
THINGS HAVE BEEN GETTING ON TOP OF ME: NO, I HAVE BEEN COPING AS WELL AS EVER

## 2024-10-21 ASSESSMENT — PAIN SCALES - GENERAL
PAINLEVEL_OUTOF10: 1
PAINLEVEL_OUTOF10: 4
PAINLEVEL_OUTOF10: 2
PAINLEVEL_OUTOF10: 10
PAINLEVEL_OUTOF10: 4
PAINLEVEL_OUTOF10: 3

## 2024-10-22 VITALS
HEIGHT: 62 IN | TEMPERATURE: 98.1 F | BODY MASS INDEX: 25.76 KG/M2 | SYSTOLIC BLOOD PRESSURE: 136 MMHG | OXYGEN SATURATION: 100 % | DIASTOLIC BLOOD PRESSURE: 86 MMHG | RESPIRATION RATE: 16 BRPM | HEART RATE: 60 BPM | WEIGHT: 140 LBS

## 2024-10-22 LAB
ALBUMIN SERPL-MCNC: 2.1 G/DL (ref 3.4–5)
ALBUMIN/GLOB SERPL: 0.5 {RATIO} (ref 1–2.4)
ALP SERPL-CCNC: 244 UNITS/L (ref 45–117)
ALT SERPL-CCNC: 93 UNITS/L
ANION GAP SERPL CALC-SCNC: 8 MMOL/L (ref 7–19)
AST SERPL-CCNC: 49 UNITS/L
BILIRUB SERPL-MCNC: 0.3 MG/DL (ref 0.2–1)
BUN SERPL-MCNC: 10 MG/DL (ref 6–20)
BUN/CREAT SERPL: 15 (ref 7–25)
CALCIUM SERPL-MCNC: 9.3 MG/DL (ref 8.4–10.2)
CHLORIDE SERPL-SCNC: 108 MMOL/L (ref 97–110)
CO2 SERPL-SCNC: 25 MMOL/L (ref 21–32)
CREAT SERPL-MCNC: 0.68 MG/DL (ref 0.51–0.95)
EGFRCR SERPLBLD CKD-EPI 2021: >90 ML/MIN/{1.73_M2}
FASTING DURATION TIME PATIENT: ABNORMAL H
GLOBULIN SER-MCNC: 4.2 G/DL (ref 2–4)
GLUCOSE SERPL-MCNC: 100 MG/DL (ref 70–99)
POTASSIUM SERPL-SCNC: 3.4 MMOL/L (ref 3.4–5.1)
PROT SERPL-MCNC: 6.3 G/DL (ref 6.4–8.2)
SODIUM SERPL-SCNC: 138 MMOL/L (ref 135–145)

## 2024-10-22 PROCEDURE — 10002800 HB RX 250 W HCPCS: Performed by: OBSTETRICS & GYNECOLOGY

## 2024-10-22 PROCEDURE — 10004651 HB RX, NO CHARGE ITEM: Performed by: OBSTETRICS & GYNECOLOGY

## 2024-10-22 PROCEDURE — 10002803 HB RX 637: Performed by: OBSTETRICS & GYNECOLOGY

## 2024-10-22 PROCEDURE — 80053 COMPREHEN METABOLIC PANEL: CPT | Performed by: OBSTETRICS & GYNECOLOGY

## 2024-10-22 PROCEDURE — 36415 COLL VENOUS BLD VENIPUNCTURE: CPT | Performed by: OBSTETRICS & GYNECOLOGY

## 2024-10-22 PROCEDURE — 90707 MMR VACCINE SC: CPT | Performed by: OBSTETRICS & GYNECOLOGY

## 2024-10-22 PROCEDURE — S9445 PT EDUCATION NOC INDIVID: HCPCS

## 2024-10-22 RX ORDER — ACETAMINOPHEN 500 MG
1000 TABLET ORAL EVERY 8 HOURS
Status: SHIPPED | COMMUNITY
Start: 2024-10-22

## 2024-10-22 RX ORDER — OXYCODONE HYDROCHLORIDE 5 MG/1
5 TABLET ORAL EVERY 6 HOURS PRN
Qty: 8 TABLET | Refills: 0 | Status: SHIPPED | OUTPATIENT
Start: 2024-10-22

## 2024-10-22 RX ORDER — IBUPROFEN 600 MG/1
600 TABLET, FILM COATED ORAL EVERY 6 HOURS
Qty: 30 TABLET | Refills: 0 | Status: SHIPPED | OUTPATIENT
Start: 2024-10-22

## 2024-10-22 RX ORDER — AMOXICILLIN 250 MG
2 CAPSULE ORAL DAILY PRN
Status: SHIPPED | COMMUNITY
Start: 2024-10-22

## 2024-10-22 RX ORDER — CALCIUM CARBONATE 500 MG/1
500 TABLET, CHEWABLE ORAL EVERY 4 HOURS PRN
Status: SHIPPED | COMMUNITY
Start: 2024-10-22

## 2024-10-22 RX ADMIN — ACETAMINOPHEN 1000 MG: 500 TABLET ORAL at 04:24

## 2024-10-22 RX ADMIN — IBUPROFEN 600 MG: 600 TABLET, FILM COATED ORAL at 04:24

## 2024-10-22 RX ADMIN — IBUPROFEN 600 MG: 600 TABLET, FILM COATED ORAL at 12:55

## 2024-10-22 RX ADMIN — MEASLES, MUMPS, AND RUBELLA VIRUS VACCINE LIVE 0.5 ML: 1000; 12500; 1000 INJECTION, POWDER, LYOPHILIZED, FOR SUSPENSION SUBCUTANEOUS at 13:28

## 2024-10-22 RX ADMIN — ACETAMINOPHEN 1000 MG: 500 TABLET ORAL at 12:55

## 2024-10-22 ASSESSMENT — PAIN SCALES - GENERAL
PAINLEVEL_OUTOF10: 4
PAINLEVEL_OUTOF10: 2
PAINLEVEL_OUTOF10: 4

## 2024-10-22 ASSESSMENT — PATIENT HEALTH QUESTIONNAIRE - PHQ9: IS PATIENT ABLE TO COMPLETE PHQ2 OR PHQ9: YES

## 2024-10-28 LAB
ASR DISCLAIMER: NORMAL
CASE RPRT: NORMAL
CLINICAL INFO: NORMAL
PATH REPORT.FINAL DX SPEC: NORMAL
PATH REPORT.GROSS SPEC: NORMAL

## 2025-06-10 ENCOUNTER — OFFICE VISIT (OUTPATIENT)
Facility: CLINIC | Age: 30
End: 2025-06-10

## 2025-06-10 ENCOUNTER — LAB ENCOUNTER (OUTPATIENT)
Dept: LAB | Facility: HOSPITAL | Age: 30
End: 2025-06-10
Attending: NURSE PRACTITIONER
Payer: COMMERCIAL

## 2025-06-10 VITALS
HEIGHT: 61 IN | HEART RATE: 84 BPM | BODY MASS INDEX: 22.66 KG/M2 | DIASTOLIC BLOOD PRESSURE: 76 MMHG | SYSTOLIC BLOOD PRESSURE: 114 MMHG | WEIGHT: 120 LBS

## 2025-06-10 DIAGNOSIS — R11.2 NAUSEA AND VOMITING, UNSPECIFIED VOMITING TYPE: ICD-10-CM

## 2025-06-10 DIAGNOSIS — R10.13 EPIGASTRIC PAIN: ICD-10-CM

## 2025-06-10 DIAGNOSIS — R11.2 NAUSEA AND VOMITING, UNSPECIFIED VOMITING TYPE: Primary | ICD-10-CM

## 2025-06-10 LAB
ALBUMIN SERPL-MCNC: 4.9 G/DL (ref 3.2–4.8)
ALP LIVER SERPL-CCNC: 828 U/L (ref 37–98)
ALT SERPL-CCNC: 554 U/L (ref 10–49)
AST SERPL-CCNC: 368 U/L (ref ?–34)
BASOPHILS # BLD AUTO: 0.08 X10(3) UL (ref 0–0.2)
BASOPHILS NFR BLD AUTO: 0.6 %
BILIRUB DIRECT SERPL-MCNC: 1.3 MG/DL (ref ?–0.3)
BILIRUB SERPL-MCNC: 2.8 MG/DL (ref 0.3–1.2)
DEPRECATED RDW RBC AUTO: 43.1 FL (ref 35.1–46.3)
EOSINOPHIL # BLD AUTO: 0.06 X10(3) UL (ref 0–0.7)
EOSINOPHIL NFR BLD AUTO: 0.4 %
ERYTHROCYTE [DISTWIDTH] IN BLOOD BY AUTOMATED COUNT: 15 % (ref 11–15)
HCT VFR BLD AUTO: 41.8 % (ref 35–48)
HGB BLD-MCNC: 13.2 G/DL (ref 12–16)
IMM GRANULOCYTES # BLD AUTO: 0.07 X10(3) UL (ref 0–1)
IMM GRANULOCYTES NFR BLD: 0.5 %
LIPASE SERPL-CCNC: 51 U/L (ref 12–53)
LYMPHOCYTES # BLD AUTO: 2.83 X10(3) UL (ref 1–4)
LYMPHOCYTES NFR BLD AUTO: 20.3 %
MCH RBC QN AUTO: 25.2 PG (ref 26–34)
MCHC RBC AUTO-ENTMCNC: 31.6 G/DL (ref 31–37)
MCV RBC AUTO: 79.9 FL (ref 80–100)
MONOCYTES # BLD AUTO: 0.52 X10(3) UL (ref 0.1–1)
MONOCYTES NFR BLD AUTO: 3.7 %
NEUTROPHILS # BLD AUTO: 10.35 X10 (3) UL (ref 1.5–7.7)
NEUTROPHILS # BLD AUTO: 10.35 X10(3) UL (ref 1.5–7.7)
NEUTROPHILS NFR BLD AUTO: 74.5 %
PLATELET # BLD AUTO: 452 10(3)UL (ref 150–450)
PROT SERPL-MCNC: 7.9 G/DL (ref 5.7–8.2)
RBC # BLD AUTO: 5.23 X10(6)UL (ref 3.8–5.3)
WBC # BLD AUTO: 13.9 X10(3) UL (ref 4–11)

## 2025-06-10 PROCEDURE — 85025 COMPLETE CBC W/AUTO DIFF WBC: CPT

## 2025-06-10 PROCEDURE — 99204 OFFICE O/P NEW MOD 45 MIN: CPT | Performed by: NURSE PRACTITIONER

## 2025-06-10 PROCEDURE — 36415 COLL VENOUS BLD VENIPUNCTURE: CPT

## 2025-06-10 PROCEDURE — 83690 ASSAY OF LIPASE: CPT

## 2025-06-10 PROCEDURE — 80076 HEPATIC FUNCTION PANEL: CPT

## 2025-06-10 RX ORDER — FAMOTIDINE 10 MG
10 TABLET ORAL 2 TIMES DAILY
COMMUNITY
End: 2025-06-11 | Stop reason: CLARIF

## 2025-06-10 NOTE — H&P
[facility] - Gastroenterology                                                                                                               Reason for consult: abdominal pain, vomiting     Requesting physician or provider: No primary care provider on file.    Chief Complaint   Patient presents with    Abdominal Pain    Vomiting       HPI:   Delroy Walls is a 29 year old year-old female with no chronic medical conditions.     She is here today with  for translation for evaluation of abdominal pain and vomiting x3-4 days.  Was seen at immediate care this week, was given PPI and Famotidine, has helped a little.   Did not do any imaging.   Has moved from [facility] 2 weeks ago. Has had a lot of diet changes.   Currently breastfeeding.       Last colonoscopy: none  Last EGD: none    NSAIDS: none  Tobacco: none  Alcohol: none  Marijuana: none  Illicit drugs: none    No family history of GI malignancy or IBD.     No history of adverse reaction to sedation  No LEONORA  No anticoagulants/antiplatelet  No pacemaker/defibrillator    Wt Readings from Last 6 Encounters:   06/10/25 120 lb (54.4 kg)        History, Medications, Allergies, ROS:      Past Medical History[1]   Past Surgical History[2]   Family Hx: Family History[3]   Social History: Short Social Hx on File[4]     Medications (Active prior to today's visit):  Current Medications[5]    Allergies:  Allergies[6]    ROS:   CONSTITUTIONAL: negative for fevers, chills, sweats  EYES Negative for scleral icterus or redness, and diplopia  HEENT: Negative for hoarseness  RESPIRATORY: Negative for cough and severe shortness of breath  CARDIOVASCULAR: Negative for crushing sub-sternal chest pain  GASTROINTESTINAL: See HPI  GENITOURINARY: Negative for dysuria  MUSCULOSKELETAL: Negative for arthralgias and myalgias  SKIN: Negative for jaundice, rash or pruritus  NEUROLOGICAL: Negative for  dizziness and headaches  BEHAVIOR/PSYCH: Negative for psychotic behavior    PHYSICAL EXAM:   Blood pressure 114/76, pulse 84, height 5' 1\" (1.549 m), weight 120 lb (54.4 kg).    GEN: Alert, no acute distress, well-nourished   HEENT: anicteric sclera, neck supple, trachea midline, MMM, no palpable or tender neck or supraclavicular lymph nodes  CV: RRR, the extremities are warm and well perfused   LUNGS: No increased work of breathing, CTAB  ABDOMEN: Soft, symmetrical, non-tender without distention or guarding. No scars or lesions. Umbilicus is midline without herniation. Normoactive bowel sounds are present, No masses, hepatomegaly or splenomegaly noted.  MSK: No erythema, no warmth, no swelling of joints  SKIN: No jaundice, no erythema, no rashes, no lesions  HEMATOLOGIC: No bleeding, no bruising  NEURO: Alert and interactive, SHANE  PSYCH: appropriate mood & affect    Labs/Imaging/Procedures:     Patient's pertinent labs and imaging were reviewed and discussed with patient today.        .  ASSESSMENT/PLAN:   29 year old female presents for epigastric pain and vomiting.    1. Nausea and vomiting, unspecified vomiting type  - US GALLBLADDER (CPT=76705); Future  - Hepatic Function Panel (7); Future  - CBC W Differential W Platelet; Future  - Lipase; Future    2. Epigastric pain  - US GALLBLADDER (CPT=76705); Future  - Hepatic Function Panel (7); Future  - CBC W Differential W Platelet; Future  - Lipase; Future     Patient/family declined , story is a little difficult to follow. Has not vomited in a few days. Has cut down from 2 spicy meals/day to 1. Recommended following bland diet and continuing PPI as directed by UC. Appears stable today.    Hepatic panel, lipase, and cbc pending. Needs to schedule gallbladder US.  ER precautions reviewed.       There are no Patient Instructions on file for this visit.     Orders This Visit:  Orders Placed This Encounter   Procedures    Hepatic Function Panel (7)    CBC W  Differential W Platelet    Lipase       Meds This Visit:  Requested Prescriptions      No prescriptions requested or ordered in this encounter       Imaging & Referrals:  US GALLBLADDER (CPT=76705)      EDILSON Rivera    [facility] Gastroenterology  6/10/2025               [1] History reviewed. No pertinent past medical history.  [2] History reviewed. No pertinent surgical history.  [3]   Family History  Problem Relation Age of Onset    Colon Cancer Neg    [4]   Social History  Socioeconomic History    Marital status:    Tobacco Use    Smoking status: Never     Passive exposure: Never    Smokeless tobacco: Never   Substance and Sexual Activity    Drug use: Never   [5]   Current Outpatient Medications   Medication Sig Dispense Refill    Multiple Vitamin-Folic Acid Oral Tab Take 1 tablet by mouth in the morning.      famotidine 10 MG Oral Tab Take 1 tablet (10 mg total) by mouth 2 (two) times daily.     [6] No Known Allergies

## 2025-06-11 ENCOUNTER — APPOINTMENT (OUTPATIENT)
Dept: ULTRASOUND IMAGING | Facility: HOSPITAL | Age: 30
End: 2025-06-11
Attending: EMERGENCY MEDICINE
Payer: COMMERCIAL

## 2025-06-11 ENCOUNTER — HOSPITAL ENCOUNTER (OUTPATIENT)
Facility: HOSPITAL | Age: 30
Setting detail: OBSERVATION
Discharge: HOME | End: 2025-06-13
Attending: EMERGENCY MEDICINE | Admitting: INTERNAL MEDICINE
Payer: COMMERCIAL

## 2025-06-11 ENCOUNTER — APPOINTMENT (OUTPATIENT)
Dept: MRI IMAGING | Facility: HOSPITAL | Age: 30
End: 2025-06-11
Attending: INTERNAL MEDICINE
Payer: COMMERCIAL

## 2025-06-11 ENCOUNTER — TELEPHONE (OUTPATIENT)
Facility: CLINIC | Age: 30
End: 2025-06-11

## 2025-06-11 DIAGNOSIS — K80.21 CALCULUS OF GALLBLADDER WITH BILIARY OBSTRUCTION BUT WITHOUT CHOLECYSTITIS: ICD-10-CM

## 2025-06-11 DIAGNOSIS — R10.13 EPIGASTRIC PAIN: Primary | ICD-10-CM

## 2025-06-11 LAB
ALBUMIN SERPL-MCNC: 5.2 G/DL (ref 3.2–4.8)
ALBUMIN/GLOB SERPL: 2 {RATIO} (ref 1–2)
ALP LIVER SERPL-CCNC: 796 U/L (ref 37–98)
ALT SERPL-CCNC: 535 U/L (ref 10–49)
ANION GAP SERPL CALC-SCNC: 10 MMOL/L (ref 0–18)
AST SERPL-CCNC: 263 U/L (ref ?–34)
B-HCG UR QL: NEGATIVE
BASOPHILS # BLD AUTO: 0.09 X10(3) UL (ref 0–0.2)
BASOPHILS NFR BLD AUTO: 0.6 %
BILIRUB SERPL-MCNC: 2.2 MG/DL (ref 0.3–1.2)
BUN BLD-MCNC: 7 MG/DL (ref 9–23)
BUN/CREAT SERPL: 8 (ref 10–20)
CALCIUM BLD-MCNC: 9.7 MG/DL (ref 8.7–10.4)
CHLORIDE SERPL-SCNC: 104 MMOL/L (ref 98–112)
CO2 SERPL-SCNC: 25 MMOL/L (ref 21–32)
CREAT BLD-MCNC: 0.87 MG/DL (ref 0.55–1.02)
DEPRECATED RDW RBC AUTO: 43.4 FL (ref 35.1–46.3)
EGFRCR SERPLBLD CKD-EPI 2021: 92 ML/MIN/1.73M2 (ref 60–?)
EOSINOPHIL # BLD AUTO: 0.15 X10(3) UL (ref 0–0.7)
EOSINOPHIL NFR BLD AUTO: 1.1 %
ERYTHROCYTE [DISTWIDTH] IN BLOOD BY AUTOMATED COUNT: 15.1 % (ref 11–15)
GLOBULIN PLAS-MCNC: 2.6 G/DL (ref 2–3.5)
GLUCOSE BLD-MCNC: 89 MG/DL (ref 70–99)
HAV AB SER QL IA: NONREACTIVE
HBV CORE AB SERPL QL IA: NONREACTIVE
HBV SURFACE AB SER QL: NONREACTIVE
HBV SURFACE AB SERPL IA-ACNC: 7.03 MIU/ML
HBV SURFACE AG SERPL QL IA: NONREACTIVE
HCT VFR BLD AUTO: 40.8 % (ref 35–48)
HCV AB SERPL QL IA: NONREACTIVE
HGB BLD-MCNC: 13 G/DL (ref 12–16)
IMM GRANULOCYTES # BLD AUTO: 0.06 X10(3) UL (ref 0–1)
IMM GRANULOCYTES NFR BLD: 0.4 %
LIPASE SERPL-CCNC: 696 U/L (ref 12–53)
LYMPHOCYTES # BLD AUTO: 3.94 X10(3) UL (ref 1–4)
LYMPHOCYTES NFR BLD AUTO: 27.7 %
MCH RBC QN AUTO: 25.3 PG (ref 26–34)
MCHC RBC AUTO-ENTMCNC: 31.9 G/DL (ref 31–37)
MCV RBC AUTO: 79.5 FL (ref 80–100)
MONOCYTES # BLD AUTO: 0.85 X10(3) UL (ref 0.1–1)
MONOCYTES NFR BLD AUTO: 6 %
NEUTROPHILS # BLD AUTO: 9.15 X10 (3) UL (ref 1.5–7.7)
NEUTROPHILS # BLD AUTO: 9.15 X10(3) UL (ref 1.5–7.7)
NEUTROPHILS NFR BLD AUTO: 64.2 %
OSMOLALITY SERPL CALC.SUM OF ELEC: 285 MOSM/KG (ref 275–295)
PLATELET # BLD AUTO: 434 10(3)UL (ref 150–450)
POTASSIUM SERPL-SCNC: 3.4 MMOL/L (ref 3.5–5.1)
PROT SERPL-MCNC: 7.8 G/DL (ref 5.7–8.2)
RBC # BLD AUTO: 5.13 X10(6)UL (ref 3.8–5.3)
SODIUM SERPL-SCNC: 139 MMOL/L (ref 136–145)
WBC # BLD AUTO: 14.2 X10(3) UL (ref 4–11)

## 2025-06-11 PROCEDURE — 74181 MRI ABDOMEN W/O CONTRAST: CPT | Performed by: INTERNAL MEDICINE

## 2025-06-11 PROCEDURE — 76705 ECHO EXAM OF ABDOMEN: CPT | Performed by: EMERGENCY MEDICINE

## 2025-06-11 PROCEDURE — 76376 3D RENDER W/INTRP POSTPROCES: CPT | Performed by: INTERNAL MEDICINE

## 2025-06-11 PROCEDURE — 99223 1ST HOSP IP/OBS HIGH 75: CPT | Performed by: INTERNAL MEDICINE

## 2025-06-11 PROCEDURE — 99232 SBSQ HOSP IP/OBS MODERATE 35: CPT | Performed by: STUDENT IN AN ORGANIZED HEALTH CARE EDUCATION/TRAINING PROGRAM

## 2025-06-11 RX ORDER — METRONIDAZOLE 500 MG/100ML
500 INJECTION, SOLUTION INTRAVENOUS EVERY 8 HOURS
Status: DISCONTINUED | OUTPATIENT
Start: 2025-06-11 | End: 2025-06-12

## 2025-06-11 RX ORDER — ACETAMINOPHEN 325 MG/1
650 TABLET ORAL EVERY 4 HOURS PRN
Status: DISCONTINUED | OUTPATIENT
Start: 2025-06-11 | End: 2025-06-12

## 2025-06-11 RX ORDER — ONDANSETRON 2 MG/ML
4 INJECTION INTRAMUSCULAR; INTRAVENOUS EVERY 6 HOURS PRN
Status: DISCONTINUED | OUTPATIENT
Start: 2025-06-11 | End: 2025-06-13

## 2025-06-11 RX ORDER — SENNOSIDES 8.6 MG
17.2 TABLET ORAL NIGHTLY PRN
Status: DISCONTINUED | OUTPATIENT
Start: 2025-06-11 | End: 2025-06-13

## 2025-06-11 RX ORDER — PRENATAL WITH FERROUS FUM AND FOLIC ACID 3080; 920; 120; 400; 22; 1.84; 3; 20; 10; 1; 12; 200; 27; 25; 2 [IU]/1; [IU]/1; MG/1; [IU]/1; MG/1; MG/1; MG/1; MG/1; MG/1; MG/1; UG/1; MG/1; MG/1; MG/1; MG/1
1 TABLET ORAL DAILY
COMMUNITY

## 2025-06-11 RX ORDER — HYDROCODONE BITARTRATE AND ACETAMINOPHEN 5; 325 MG/1; MG/1
2 TABLET ORAL EVERY 4 HOURS PRN
Refills: 0 | Status: DISCONTINUED | OUTPATIENT
Start: 2025-06-11 | End: 2025-06-12

## 2025-06-11 RX ORDER — FOLIC ACID 1 MG/1
1 TABLET ORAL DAILY
COMMUNITY

## 2025-06-11 RX ORDER — MORPHINE SULFATE 2 MG/ML
2 INJECTION, SOLUTION INTRAMUSCULAR; INTRAVENOUS EVERY 2 HOUR PRN
Status: DISCONTINUED | OUTPATIENT
Start: 2025-06-11 | End: 2025-06-12

## 2025-06-11 RX ORDER — HYDROCODONE BITARTRATE AND ACETAMINOPHEN 5; 325 MG/1; MG/1
1 TABLET ORAL EVERY 4 HOURS PRN
Refills: 0 | Status: DISCONTINUED | OUTPATIENT
Start: 2025-06-11 | End: 2025-06-12

## 2025-06-11 RX ORDER — MORPHINE SULFATE 4 MG/ML
4 INJECTION, SOLUTION INTRAMUSCULAR; INTRAVENOUS EVERY 2 HOUR PRN
Status: DISCONTINUED | OUTPATIENT
Start: 2025-06-11 | End: 2025-06-12

## 2025-06-11 RX ORDER — MORPHINE SULFATE 2 MG/ML
1 INJECTION, SOLUTION INTRAMUSCULAR; INTRAVENOUS EVERY 2 HOUR PRN
Status: DISCONTINUED | OUTPATIENT
Start: 2025-06-11 | End: 2025-06-12

## 2025-06-11 RX ORDER — BISACODYL 10 MG
10 SUPPOSITORY, RECTAL RECTAL
Status: DISCONTINUED | OUTPATIENT
Start: 2025-06-11 | End: 2025-06-13

## 2025-06-11 RX ORDER — PROCHLORPERAZINE EDISYLATE 5 MG/ML
5 INJECTION INTRAMUSCULAR; INTRAVENOUS EVERY 8 HOURS PRN
Status: DISCONTINUED | OUTPATIENT
Start: 2025-06-11 | End: 2025-06-13

## 2025-06-11 RX ORDER — SODIUM CHLORIDE, SODIUM LACTATE, POTASSIUM CHLORIDE, CALCIUM CHLORIDE 600; 310; 30; 20 MG/100ML; MG/100ML; MG/100ML; MG/100ML
INJECTION, SOLUTION INTRAVENOUS CONTINUOUS
Status: DISCONTINUED | OUTPATIENT
Start: 2025-06-11 | End: 2025-06-13

## 2025-06-11 RX ORDER — SODIUM PHOSPHATE, DIBASIC AND SODIUM PHOSPHATE, MONOBASIC 7; 19 G/230ML; G/230ML
1 ENEMA RECTAL ONCE AS NEEDED
Status: DISCONTINUED | OUTPATIENT
Start: 2025-06-11 | End: 2025-06-13

## 2025-06-11 RX ORDER — POLYETHYLENE GLYCOL 3350 17 G/17G
17 POWDER, FOR SOLUTION ORAL DAILY PRN
Status: DISCONTINUED | OUTPATIENT
Start: 2025-06-11 | End: 2025-06-13

## 2025-06-11 RX ORDER — RABEPRAZOLE SODIUM 20 MG/1
20 TABLET, DELAYED RELEASE ORAL DAILY
COMMUNITY

## 2025-06-11 RX ORDER — FAMOTIDINE 40 MG/1
40 TABLET, FILM COATED ORAL NIGHTLY
COMMUNITY

## 2025-06-11 RX ORDER — SIMETHICONE 125 MG
125 TABLET,CHEWABLE ORAL EVERY 6 HOURS PRN
COMMUNITY

## 2025-06-11 NOTE — ED QUICK NOTES
Pt transported to floor via cart accompanied by ERT.  Pt left dept in NAD, VSS, A&O x 4.  Pt belongings verified w/ pt & accompanying pt to floor.

## 2025-06-11 NOTE — CONSULTS
Is this a shared or split note between Advanced Practice Provider and Physician? Yes    [facility]  Report of Consultation    Delroy Walls Patient Status:  Inpatient    8/15/1995 MRN [id number]   Location [facility] 1W Attending Katherine Martell DO   Hosp Day # 0 PCP None Pcp     Requesting Physician:  Dr. Govea    Reason for Consultation:  Choledocholithiasis    Chief Complaint:  Abdominal pain    History of Present Illness:  Delroy Walls is a 29 year old female who presents to [facility] on 2025 with complaints of abdominal pain for the past 7 days. Denies fevers, chills, nausea, emesis. Denies diarrhea or constipation. Denies ever having a pain like this in the past. Patient is 7.5 months post-partum.     Upon presentation to the hospital, patient was afebrile and hemodynamically stable.  Elevated LFTs-, , total bilirubin 2.2,   Direct bilirubin 1.3.  WBC elevated at 14.2.  Gallbladder ultrasound showed cholelithiasis and sludge without evidence of acute cholecystitis.    Since being admitted to the hospital, patient reports she is not having any pain.    Past medical history is negative. Denies daily medication use.     Past abdominal surgical history include .       History:  Past Medical History[1]  Past Surgical History[2]  Family History[3]   reports that she has never smoked. She has never been exposed to tobacco smoke. She has never used smokeless tobacco. She reports that she does not use drugs.    Allergies:  Allergies[4]    Medications:  Prescriptions Prior to Admission[5]    Current Hospital Medications[6]    Review of Systems:  Review of Systems   Constitutional:  Negative for chills, fatigue and fever.   Gastrointestinal:  Positive for abdominal pain. Negative for nausea, vomiting, diarrhea, constipation and abdominal distention.       Physical Exam:  /69   Pulse 52   Temp 98.3 °F (36.8 °C) (Oral)   Resp 16   SpO2  100%   Physical Exam  Constitutional:       Appearance: Normal appearance. She is normal weight.   HENT:      Head: Normocephalic.   Eyes:      Pupils: Pupils are equal, round, and reactive to light.   Cardiovascular:      Rate and Rhythm: Normal rate and regular rhythm.   Pulmonary:      Effort: Pulmonary effort is normal.   Abdominal:      General: Abdomen is flat. There is no distension.      Palpations: Abdomen is soft.      Tenderness: There is no abdominal tenderness. There is no guarding or rebound.      Comments: Non tender to palpation upon examination.    Skin:     General: Skin is warm and dry.   Neurological:      General: No focal deficit present.      Mental Status: She is alert and oriented to person, place, and time.         Laboratory Data:  Recent Labs   Lab 06/10/25  1740 06/11/25  1025   RBC 5.23 5.13   HGB 13.2 13.0   HCT 41.8 40.8   MCV 79.9* 79.5*   MCH 25.2* 25.3*   MCHC 31.6 31.9   RDW 15.0 15.1*   NEPRELIM 10.35* 9.15*   WBC 13.9* 14.2*   .0* 434.0       Recent Labs   Lab 06/10/25  1740 06/11/25  1025   GLU  --  89   BUN  --  7*   CREATSERUM  --  0.87   CA  --  9.7   ALB 4.9* 5.2*   NA  --  139   K  --  3.4*   CL  --  104   CO2  --  25.0   ALKPHO 828* 796*   * 263*   * 535*   BILT 2.8* 2.2*   TP 7.9 7.8         No results for input(s): \"PTP\", \"INR\", \"PTT\" in the last 168 hours.      US GALLBLADDER (CPT=76705)  Result Date: 6/11/2025  CONCLUSION:   1. Cholelithiasis and sludge within an otherwise normal-appearing gallbladder.  There is no sonographic evidence of acute cholecystitis.  2. Normal sonographic appearance of the liver, right kidney, and pancreas.    Dictated by (CST): Ismael Edmonds MD on 6/11/2025 at 11:13 AM     Finalized by (CST): Ismael Edmonds MD on 6/11/2025 at 11:17 AM                      Medical Decision Making         Impression:  Problem List[7]    Cholelithiasis, suspected choledocholithiasis    Plan:  Patient scheduled for MRCP today to rule  out choledocholithiasis  GI consulted, appreciate recommendations  Plan for cholecystectomy during this admission, timing TBD pending MRCP results  Okay for clear liquids from surgical standpoint if no plans for ERCP today  Continue IV abx  Continue analgesics and antiemetics as needed  Dr. Terry to evaluate patient and provide further surgical recommendations    Nat Bhatia PA-C  6/11/2025  12:32 PM                           [1] No past medical history on file.  [2] No past surgical history on file.  [3]   Family History  Problem Relation Age of Onset    Colon Cancer Neg    [4] No Known Allergies  [5]   Medications Prior to Admission   Medication Sig    famotidine 40 MG Oral Tab Take 1 tablet (40 mg total) by mouth at bedtime.    RABEprazole Sodium 20 MG Oral Tab EC Take 1 tablet (20 mg total) by mouth daily.    simethicone (GAS-X EXTRA STRENGTH) 125 MG Oral Chew Tab Chew 1 tablet (125 mg total) by mouth every 6 (six) hours as needed.    Prenatal 27-1 MG Oral Tab Take 1 tablet by mouth daily.   [6] No current facility-administered medications for this encounter.  [7]   Patient Active Problem List  Diagnosis    Epigastric pain    Calculus of gallbladder with biliary obstruction but without cholecystitis

## 2025-06-11 NOTE — CONSULTS
Is this a shared or split note between Advanced Practice Provider and Physician? Yes      [facility]   Gastroenterology Consultation Note    Delroy Walls  Patient Status:    Inpatient  Date of Admission:         6/11/2025, Hospital day #0  Attending:   Pete Marion MD  PCP:     None Pcp    Reason for Consultation:  RUQ ABD Pain, Elevated LFTs    History of Present Illness:  Delroy Walls is a 29 year old female w/ no PMHx of who presented to the ED after being advised by GI with OP labs revealing elevated LFTs.    Pt's  and baby at bedside.    Pt states that she had been in [place] last week Monday, when she ate with subsequent post-prandial RUQ pain associated with nausea and vomiting.  Non-bloody emesis.  No dyspepsia, difficult/painful swallowing, constipation/diarrhea, black/bloody stools, fever, chills, chest pain, SOB, or unintentional weigh loss.  She had been taking pepcid and tums as needed with mild improvement.  No NSAIDs.     Pertinent Family Hx:  - No known history of esophageal, gastric or colon cancers  - No known IBD  - No known liver pathologies    Endoscopy Hx:  - No prior EGD/Colonoscopy    Social Hx:  - No tobacco use  - No ETOH  - Denies cannabis/illicit drug use  - Denies NSAIDs  - Lives with spouse and family  - Occupation: N/A     History:  Past Medical History[1]  Past Surgical History[2]  Family History[3]   reports that she has never smoked. She has never been exposed to tobacco smoke. She has never used smokeless tobacco. She reports that she does not use drugs.    Allergies:  Allergies[4]    Medications:  Current Hospital Medications[5]    Review of Systems:   CONSTITUTIONAL:  negative for fevers, chills, unintentional weight loss   EYES:  negative for diplopia or change in vision   RESPIRATORY:  negative for severe shortness of breath  CARDIOVASCULAR:  negative for crushing sub-sternal chest pain  GASTROINTESTINAL:  see HPI  GENITOURINARY:  negative for  dysuria or gross hematuria  INTEGUMENT/BREAST:  SKIN:  negative for jaundice or new rash   ALLERGIC/IMMUNOLOGIC:  negative for hay fever   ENDOCRINE:  negative for cold intolerance and heat intolerance  MUSCULOSKELETAL:  negative for joint effusion/severe erythema  NEURO: negative for new loss of consciousness or dizziness   BEHAVIOR/PSYCH:  negative for psychotic behavior    Physical Exam:    Blood pressure 107/74, pulse 54, temperature 98.2 °F (36.8 °C), temperature source Oral, resp. rate 18, height 5' 1\" (1.549 m), weight 121 lb 4.1 oz (55 kg), SpO2 100%, currently breastfeeding. Body mass index is 22.91 kg/m².    Gen: awake, alert patient, NAD  HEENT: EOMI, the sclera appears anicteric, oropharynx clear, mucus membranes appear moist  CV: RRR  Lung: no conversational dyspnea   Abdomen: soft, mild TTP in RUQ/Mid epigastrium without rebound or guarding, ND abdomen with NABS appreciated   Back: No CVA tenderness   Skin: dry, warm, no jaundice  Ext: no LE edema is evident  Neuro: Alert, oriented x4 and interactive  Psych: calm, cooperative    Laboratory Data:  Lab Results   Component Value Date    WBC 14.2 06/11/2025    HGB 13.0 06/11/2025    HCT 40.8 06/11/2025    .0 06/11/2025    CREATSERUM 0.87 06/11/2025    BUN 7 06/11/2025     06/11/2025    K 3.4 06/11/2025     06/11/2025    CO2 25.0 06/11/2025    GLU 89 06/11/2025    CA 9.7 06/11/2025    ALB 5.2 06/11/2025    ALKPHO 796 06/11/2025    BILT 2.2 06/11/2025    TP 7.8 06/11/2025     06/11/2025     06/11/2025     06/11/2025       Imaging:  US GALLBLADDER (CPT=76705)  Result Date: 6/11/2025  CONCLUSION:   1. Cholelithiasis and sludge within an otherwise normal-appearing gallbladder.  There is no sonographic evidence of acute cholecystitis.  2. Normal sonographic appearance of the liver, right kidney, and pancreas.    Dictated by (CST): Ismael Edmonds MD on 6/11/2025 at 11:13 AM     Finalized by (CST): Ismael Edmonds MD on  2025 at 11:17 AM            Assessment & Plan   Delroy Walls is a 29 year old female w/ no PMHx of who presented to the ED after being advised by GI with OP labs revealing elevated LFTs.    #RUQ ABD Pain  #Elevated LFTs  -Post-prandial RUQ pain with radiation to her back x1 week, associated with nausea/vomiting.  Minimal improvement with OTC antacids/antigas medications.  -Labs reveal elevated LFTs, , , , T. Bili 2.8, remains stably elevated today.  Leukocytosis to 14, Lipase 696.   -US gallbladder with cholelithiasis and sludge within otherwise normal-appearing gallbladder.  Normal liver and pancreas.  -No prior EGD/Colonoscopy   -Etiology possible choledocholithiasis v gallstone pancreatitis v passed sludge stone.  Hepatitis panel negative for acute viral infection.  -Recommend MRI/MRCP to further evaluate.  NPO for imaging, otherwise fine for clear liquids.  Consult to surgery for possible CCY.  Empiric PPI.    Recommend:  -MRI/MRCP  -NPO for imaging, otherwise fine for clears from GI stance  -Empiric PPI  -Consult to surgery for possible CCY evaluation  -IVF, pain regimen and antiemetics per primary  -Trend LFTs daily while admitted    Thank you for the opportunity to participate in the care of this patient.    Case discussed with Ashleigh Carty MD and Alex GIBBONS.    Susy Stevens [facility], [facility] Gastroenterology  2025        [1] History reviewed. No pertinent past medical history.  [2]   Past Surgical History:  Procedure Laterality Date         [3]   Family History  Problem Relation Age of Onset    Colon Cancer Neg    [4] No Known Allergies  [5]   Current Facility-Administered Medications:     lactated ringers infusion, , Intravenous, Continuous    acetaminophen (Tylenol) tab 650 mg, 650 mg, Oral, Q4H PRN **OR** HYDROcodone-acetaminophen (Norco) 5-325 MG per tab 1 tablet, 1 tablet, Oral, Q4H PRN **OR** HYDROcodone-acetaminophen (Norco) 5-325 MG per tab 2 tablet, 2  tablet, Oral, Q4H PRN    morphINE PF 2 MG/ML injection 1 mg, 1 mg, Intravenous, Q2H PRN **OR** morphINE PF 2 MG/ML injection 2 mg, 2 mg, Intravenous, Q2H PRN **OR** morphINE PF 4 MG/ML injection 4 mg, 4 mg, Intravenous, Q2H PRN    polyethylene glycol (PEG 3350) (Miralax) 17 g oral packet 17 g, 17 g, Oral, Daily PRN    sennosides (Senokot) tab 17.2 mg, 17.2 mg, Oral, Nightly PRN    bisacodyl (Dulcolax) 10 MG rectal suppository 10 mg, 10 mg, Rectal, Daily PRN    fleet enema (Fleet) rectal enema 133 mL, 1 enema, Rectal, Once PRN    ondansetron (Zofran) 4 MG/2ML injection 4 mg, 4 mg, Intravenous, Q6H PRN    prochlorperazine (Compazine) 10 MG/2ML injection 5 mg, 5 mg, Intravenous, Q8H PRN    pantoprazole (Protonix) 40 mg in sodium chloride 0.9% PF 10 mL IV push, 40 mg, Intravenous, Daily    potassium chloride 40 mEq in 250mL sodium chloride 0.9% IVPB premix, 40 mEq, Intravenous, Once    cefTRIAXone (Rocephin) 1 g in sodium chloride 0.9% 100 mL IVPB-ADDV, 1 g, Intravenous, Q24H    metroNIDAZOLE in sodium chloride 0.79% (Flagyl) 5 mg/mL IVPB premix 500 mg, 500 mg, Intravenous, Q8H

## 2025-06-11 NOTE — ED QUICK NOTES
Orders for admission, patient is aware of plan and ready to go upstairs. Any questions, please call ED RN Karuna at extension [phone].     Patient Covid vaccination status: Unvaccinated     COVID Test Ordered in ED: None    COVID Suspicion at Admission: N/A    Running Infusions: Medication Infusions[1] None    Mental Status/LOC at time of transport: A&O x 4    Other pertinent information:   CIWA score: N/A   NIH score:  N/A             [1]

## 2025-06-11 NOTE — PLAN OF CARE
Patient being transferred to room 443. All belongings are with patient. Patient  at bedside and aware of transfer. Report given to Jazzy GIBBONS.

## 2025-06-11 NOTE — TELEPHONE ENCOUNTER
Called patient/ and instructed to go to ER.   Had been having nausea/vomiting/epigastric pain for about a week.  Labs last night showing , , .   ER triage RN notified.     EDILSON Rivera

## 2025-06-11 NOTE — PLAN OF CARE
Patient and  decline  services for admission and exam. Per pharmacy flagyl is not compatible with breastfeeding, explained to patient to not breastfeed baby while in hospital. Pt verbalized understanding.   Problem: Patient Centered Care  Goal: Patient preferences are identified and integrated in the patient's plan of care  Description: Interventions:  - What would you like us to know as we care for you?   - Provide timely, complete, and accurate information to patient/family  - Incorporate patient and family knowledge, values, beliefs, and cultural backgrounds into the planning and delivery of care  - Encourage patient/family to participate in care and decision-making at the level they choose  - Honor patient and family perspectives and choices  Outcome: Progressing     Problem: Patient/Family Goals  Goal: Patient/Family Long Term Goal  Description: Patient's Long Term Goal:     Interventions:  -   - See additional Care Plan goals for specific interventions  Outcome: Progressing  Goal: Patient/Family Short Term Goal  Description: Patient's Short Term Goal:     Interventions:   -   - See additional Care Plan goals for specific interventions  Outcome: Progressing     Problem: GASTROINTESTINAL - ADULT  Goal: Minimal or absence of nausea and vomiting  Description: INTERVENTIONS:  - Maintain adequate hydration with IV or PO as ordered and tolerated  - Nasogastric tube to low intermittent suction as ordered  - Evaluate effectiveness of ordered antiemetic medications  - Provide nonpharmacologic comfort measures as appropriate  - Advance diet as tolerated, if ordered  - Obtain nutritional consult as needed  - Evaluate fluid balance  Outcome: Progressing  Goal: Maintains or returns to baseline bowel function  Description: INTERVENTIONS:  - Assess bowel function  - Maintain adequate hydration with IV or PO as ordered and tolerated  - Evaluate effectiveness of GI medications  - Encourage mobilization and  activity  - Obtain nutritional consult as needed  - Establish a toileting routine/schedule  - Consider collaborating with pharmacy to review patient's medication profile  Outcome: Progressing     Problem: PAIN - ADULT  Goal: Verbalizes/displays adequate comfort level or patient's stated pain goal  Description: INTERVENTIONS:  - Encourage pt to monitor pain and request assistance  - Assess pain using appropriate pain scale  - Administer analgesics based on type and severity of pain and evaluate response  - Implement non-pharmacological measures as appropriate and evaluate response  - Consider cultural and social influences on pain and pain management  - Manage/alleviate anxiety  - Utilize distraction and/or relaxation techniques  - Monitor for opioid side effects  - Notify MD/LIP if interventions unsuccessful or patient reports new pain  - Anticipate increased pain with activity and pre-medicate as appropriate  Outcome: Progressing     Problem: RISK FOR INFECTION - ADULT  Goal: Absence of fever/infection during anticipated neutropenic period  Description: INTERVENTIONS  - Monitor WBC  - Administer growth factors as ordered  - Implement neutropenic guidelines  Outcome: Progressing     Problem: SAFETY ADULT - FALL  Goal: Free from fall injury  Description: INTERVENTIONS:  - Assess pt frequently for physical needs  - Identify cognitive and physical deficits and behaviors that affect risk of falls.  - [place] fall precautions as indicated by assessment.  - Educate pt/family on patient safety including physical limitations  - Instruct pt to call for assistance with activity based on assessment  - Modify environment to reduce risk of injury  - Provide assistive devices as appropriate  - Consider OT/PT consult to assist with strengthening/mobility  - Encourage toileting schedule  Outcome: Progressing     Problem: DISCHARGE PLANNING  Goal: Discharge to home or other facility with appropriate resources  Description:  INTERVENTIONS:  - Identify barriers to discharge w/pt and caregiver  - Include patient/family/discharge partner in discharge planning  - Arrange for needed discharge resources and transportation as appropriate  - Identify discharge learning needs (meds, wound care, etc)  - Arrange for interpreters to assist at discharge as needed  - Consider post-discharge preferences of patient/family/discharge partner  - Complete POLST form as appropriate  - Assess patient's ability to be responsible for managing their own health  - Refer to Case Management Department for coordinating discharge planning if the patient needs post-hospital services based on physician/LIP order or complex needs related to functional status, cognitive ability or social support system  Outcome: Progressing     Problem: Altered Communication/Language Barrier  Goal: Patient/Family is able to understand and participate in their care  Description: Interventions:  - Assess communication ability and preferred communication style  - Implement communication aides and strategies  - Use visual cues when possible  - Listen attentively, be patient, do not interrupt  - Minimize distractions  - Allow time for understanding and response  - Establish method for patient to ask for assistance (call light)  - Provide an  as needed  - Communicate barriers and strategies to overcome with those who interact with patient  Outcome: Progressing

## 2025-06-11 NOTE — ED PROVIDER NOTES
Patient Seen in: [facility] Emergency Department        History  Chief Complaint   Patient presents with    Abnormal Liver Enzymes     Stated Complaint: Abnormal Labs    Subjective:   HPI            29-year-old female presents with abdominal pain, abnormal labs.  Recently moved here from [place], states epigastric abdominal pain with nausea vomiting for the last week.  Was seen by GI yesterday, told to come here due to elevated liver enzymes.      Objective:     History reviewed. No pertinent past medical history.           Past Surgical History:   Procedure Laterality Date                      Social History     Socioeconomic History    Marital status:    Tobacco Use    Smoking status: Never     Passive exposure: Never    Smokeless tobacco: Never   Substance and Sexual Activity    Drug use: Never     Social Drivers of Health     Food Insecurity: No Food Insecurity (2025)    NCSS - Food Insecurity     Worried About Running Out of Food in the Last Year: No     Ran Out of Food in the Last Year: No   Transportation Needs: No Transportation Needs (2025)    NCSS - Transportation     Lack of Transportation: No   Housing Stability: Not At Risk (2025)    NCSS - Housing/Utilities     Has Housing: Yes     Worried About Losing Housing: No     Unable to Get Utilities: No                                Physical Exam    ED Triage Vitals   BP 25 0943 116/75   Pulse 25 0943 61   Resp 25 0943 20   Temp 25 0943 98.3 °F (36.8 °C)   Temp src 25 0943 Oral   SpO2 25 0943 100 %   O2 Device 25 1015 None (Room air)       Current Vitals:   Vital Signs  BP: 101/64  Pulse: 58  Resp: 18  Temp: 98.6 °F (37 °C)  Temp src: Oral  MAP (mmHg): 74    Oxygen Therapy  SpO2: 100 %  O2 Device: None (Room air)  Pulse Oximetry Type: Intermittent            Physical Exam  Vital signs reviewed. Nursing note reviewed.  Constitutional: Well-developed. Well-nourished. In no acute  distress  HENT: Mucous membranes moist.   EYES: No scleral icterus or conjunctival injection.  NECK: Full ROM. Supple.   CARDIAC: Normal rate.   PULM/CHEST: Non labored breathing  ABD: Non distended  RECTAL: deferred  Extremities: No deformities  NEURO: Awake, alert, following commands, moving extremities, answering questions.   SKIN: Warm and dry. No rash or lesions.  PSYCH: Normal judgment. Normal affect.            ED Course  Labs Reviewed   COMP METABOLIC PANEL (14) - Abnormal; Notable for the following components:       Result Value    Potassium 3.4 (*)     BUN 7 (*)     BUN/CREA Ratio 8.0 (*)      (*)      (*)     Alkaline Phosphatase 796 (*)     Bilirubin, Total 2.2 (*)     Albumin 5.2 (*)     All other components within normal limits   CBC WITH DIFFERENTIAL WITH PLATELET - Abnormal; Notable for the following components:    WBC 14.2 (*)     MCV 79.5 (*)     MCH 25.3 (*)     RDW 15.1 (*)     Neutrophil Absolute Prelim 9.15 (*)     Neutrophil Absolute 9.15 (*)     All other components within normal limits   LIPASE - Abnormal; Notable for the following components:    Lipase 696 (*)     All other components within normal limits   HEPATITIS A B + C PROFILE - Abnormal; Notable for the following components:    Hepatitis B Surface Antibody Nonreactive (*)     All other components within normal limits   POCT PREGNANCY URINE - Normal   RAINBOW DRAW LAVENDER   RAINBOW DRAW LIGHT GREEN                            MDM     Assessment:Patient is a 29 year old female presenting to the ED due to abdominal pain, elevated LFTs.    Comorbidities/chronic illnesses impacting care: none    History obtained from: patient    External records and previous hospitalization records reviewed and documented below    Consideration of Social Determinants of Health and Impact on Medical Decision Making:  Housing/Transportation/Financial Strain/Access to healthcare/Food insecurity/family or Community support/Language and  Literacy/Substance abuse/Mental health concerns/Disabilities     -recently moved here from [place]    Radiography/Imaging:  US GALLBLADDER (CPT=76705)   Final Result   PROCEDURE: US GALLBLADDER (CPT=76705)       COMPARISON: None.       INDICATIONS: Abnormal Labs       TECHNIQUE:   The gallbladder was evaluated with grayscale ultrasound.         FINDINGS:    GALLBLADDER:   There are sludge and stones within a decompressed    gallbladder.  There is no gallbladder wall thickening, no pericholecystic    fluid, and no common bile duct dilatation.   BILE DUCTS:   Normal.  Common bile duct measures 4 mm.     OTHER:   The visualized aspects of the liver, right kidney, and pancreas    appear grossly normal.                   =====   CONCLUSION:        1. Cholelithiasis and sludge within an otherwise normal-appearing    gallbladder.  There is no sonographic evidence of acute cholecystitis.       2. Normal sonographic appearance of the liver, right kidney, and pancreas.               Dictated by (CST): Ismael Edmonds MD on 6/11/2025 at 11:13 AM        Finalized by (CST): Ismael Edmonds MD on 6/11/2025 at 11:17 AM               MRI ABDOMEN AND MRCP W/3D (CPT=74181/02251)    (Results Pending)           ED course  Patient arrives here vital signs normal.  She appears nontoxic.  Reviewed her chart with elevated liver enzymes yesterday, concern for cholecystitis, choledocholithiasis, gallstone pancreatitis, possibly hepatitis.  Denies using Tylenol, low suspicion for overdose.  Will check repeat labs, gallbladder ultrasound     Laboratory results above were independently viewed and interpreted as: elevated LFTs, elevated bilirubin, elevated lipase  Radiology: ordered and independently interpreted as: Ultrasound gallbladder shows cholelithiasis    Per radiology, no signs of cholecystitis.  Possible biliary obstruction.  Consult to GI, general surgery, will plan for admission            Medications   lactated ringers infusion (  Intravenous New Bag 6/11/25 1317)   acetaminophen (Tylenol) tab 650 mg (has no administration in time range)     Or   HYDROcodone-acetaminophen (Norco) 5-325 MG per tab 1 tablet (has no administration in time range)     Or   HYDROcodone-acetaminophen (Norco) 5-325 MG per tab 2 tablet (has no administration in time range)   morphINE PF 2 MG/ML injection 1 mg (has no administration in time range)     Or   morphINE PF 2 MG/ML injection 2 mg (has no administration in time range)     Or   morphINE PF 4 MG/ML injection 4 mg (has no administration in time range)   polyethylene glycol (PEG 3350) (Miralax) 17 g oral packet 17 g (has no administration in time range)   sennosides (Senokot) tab 17.2 mg (has no administration in time range)   bisacodyl (Dulcolax) 10 MG rectal suppository 10 mg (has no administration in time range)   fleet enema (Fleet) rectal enema 133 mL (has no administration in time range)   ondansetron (Zofran) 4 MG/2ML injection 4 mg (has no administration in time range)   prochlorperazine (Compazine) 10 MG/2ML injection 5 mg (has no administration in time range)   pantoprazole (Protonix) 40 mg in sodium chloride 0.9% PF 10 mL IV push (40 mg Intravenous Given 6/11/25 1317)   potassium chloride 40 mEq in 250mL sodium chloride 0.9% IVPB premix (40 mEq Intravenous New Bag 6/11/25 1500)   cefTRIAXone (Rocephin) 1 g in sodium chloride 0.9% 100 mL IVPB-ADDV (1 g Intravenous New Bag 6/11/25 1317)   metroNIDAZOLE in sodium chloride 0.79% (Flagyl) 5 mg/mL IVPB premix 500 mg (500 mg Intravenous New Bag 6/11/25 1317)             Admission disposition: 6/11/2025 11:55 AM           Medical Decision Making      Disposition and Plan     Clinical Impression:  1. Epigastric pain    2. Calculus of gallbladder with biliary obstruction but without cholecystitis         Disposition:  Admit  6/11/2025 11:55 am    Follow-up:  No follow-up provider specified.        Medications Prescribed:  Current Discharge Medication List                 Supplementary Documentation:         Hospital Problems       Present on Admission  Date Reviewed: 6/10/2025          ICD-10-CM Noted POA    * (Principal) Epigastric pain R10.13 6/11/2025 Unknown    Calculus of gallbladder with biliary obstruction but without cholecystitis K80.21 6/11/2025 Unknown

## 2025-06-11 NOTE — H&P
[facility] Hospitalist H&P       CC:   Chief Complaint   Patient presents with    Abnormal Liver Enzymes        PCP: None Pcp    History of Present Illness: Patient is a 29 year old female with no sig PMH sig presents with abdominal pain.  Pain started about 10 days ago focused in the epigastric and right upper quadrant area.  Patient did have some nausea and vomiting yesterday but denies any diarrhea.  Patient has not had symptoms like this in the past.  She denies any chest pain or shortness of breath with no fevers or chills.   and 7-month-old infant at bedside.    In the ER patient is afebrile, normotensive on room air.  WBC 14.2, hemoglobin 13  ,  5, total bili 2.2, lipase 696  Abdominal ultrasound: Cholelithiasis and sludge within an otherwise normal-appearing gallbladder.  There is no sonographic evidence of acute cholecystitis.  Normal sonographic appearance of the liver, right kidney, and pancreas       PMH  Past Medical History[1]     PSH  Past Surgical History[2]     ALL:  Allergies[3]     Home Medications:  Medications Taking[4]      Soc Hx  Social History     Tobacco Use    Smoking status: Never     Passive exposure: Never    Smokeless tobacco: Never   Substance Use Topics    Alcohol use: Not on file        Fam Hx  Family History[5]    Review of Systems  Comprehensive ROS reviewed and negative except for what's stated above.     OBJECTIVE:  /73   Pulse (!) 49   Temp 98.3 °F (36.8 °C) (Oral)   Resp 15   SpO2 100%   General: Alert, no acute distress  HEENT: oral mucosa normal   Neck: non tender, no adenopathy   Lungs: clear to ausculation bilaterally  Heart: Regular rate and rhythm  Abdomen: soft, non tender, non distended   Extremities: No edema  Skin: no new rash, normal color  Neuro: 5/5 strength in bilateral extremities, normal sensations  Psych: appropriate affect   Diagnostic Data:    CBC/Chem  Recent Labs   Lab 06/10/25  1740 06/11/25  1025   WBC 13.9* 14.2*   HGB 13.2  13.0   MCV 79.9* 79.5*   .0* 434.0       Recent Labs   Lab 06/11/25  1025      K 3.4*      CO2 25.0   BUN 7*   CREATSERUM 0.87   GLU 89   CA 9.7       Recent Labs   Lab 06/10/25  1740 06/11/25  1025   * 535*   * 263*   ALB 4.9* 5.2*       No results for input(s): \"TROP\" in the last 168 hours.    Radiology: US GALLBLADDER (CPT=76705)  Result Date: 6/11/2025  CONCLUSION:   1. Cholelithiasis and sludge within an otherwise normal-appearing gallbladder.  There is no sonographic evidence of acute cholecystitis.  2. Normal sonographic appearance of the liver, right kidney, and pancreas.    Dictated by (CST): Ismael Edmonds MD on 6/11/2025 at 11:13 AM     Finalized by (CST): Ismael Edmonds MD on 6/11/2025 at 11:17 AM              ASSESSMENT / PLAN:   Patient is a 29 year old female with no sig PMH sig presents with abdominal pain.  Admitted for cholelithiasis and likely choledocholithiasis.    Cholelithiasis  Likely choledocholithiasis  Transaminitis  Hyperbilirubinemia  Elevated lipase  Leukocytosis   - Patient presents with epigastric and right upper quadrant pain with nausea  - afebrile, normotensive on room air  - WBC 14.2, hemoglobin 13  - ,  5, total bili 2.2, lipase 696  - Abdominal ultrasound: Cholelithiasis and sludge within an otherwise normal-appearing gallbladder.  There is no sonographic evidence of acute cholecystitis.  Normal sonographic appearance of the liver, right kidney, and pancreas   - MRCP  - IVF, pain control and antiemetics, PPI  - Ceftriaxone and Flagyl  - GI and general surgery on consult    GERD  - PPI    FN:  - IVF: LR  - Diet: NPO    DVT Prophy: SCDs  Atrophy: Ambulate   Lines: Piv    Dispo: pending clinical course    Outpatient records or previous hospital records reviewed.     Further recommendations pending patient's clinical course.  [facility] hospitalist to continue to follow patient while in house    Patient and/or patient's family given  opportunity to ask questions and note understanding and agreeing with therapeutic plan as outlined    Thank You,  Pete Marion MD    [facility] Hospitalist  Answering Service number: [phone]         [1] No past medical history on file.  [2] No past surgical history on file.  [3] No Known Allergies  [4]   No outpatient medications have been marked as taking for the 6/11/25 encounter (Hospital Encounter).   [5]   Family History  Problem Relation Age of Onset    Colon Cancer Neg

## 2025-06-12 ENCOUNTER — ANESTHESIA EVENT (OUTPATIENT)
Dept: SURGERY | Facility: HOSPITAL | Age: 30
End: 2025-06-12
Payer: COMMERCIAL

## 2025-06-12 ENCOUNTER — APPOINTMENT (OUTPATIENT)
Dept: GENERAL RADIOLOGY | Facility: HOSPITAL | Age: 30
End: 2025-06-12
Attending: STUDENT IN AN ORGANIZED HEALTH CARE EDUCATION/TRAINING PROGRAM
Payer: COMMERCIAL

## 2025-06-12 ENCOUNTER — ANESTHESIA (OUTPATIENT)
Dept: SURGERY | Facility: HOSPITAL | Age: 30
End: 2025-06-12
Payer: COMMERCIAL

## 2025-06-12 LAB
ALBUMIN SERPL-MCNC: 4.2 G/DL (ref 3.2–4.8)
ALBUMIN/GLOB SERPL: 2 {RATIO} (ref 1–2)
ALP LIVER SERPL-CCNC: 564 U/L (ref 37–98)
ALT SERPL-CCNC: 378 U/L (ref 10–49)
ANION GAP SERPL CALC-SCNC: 11 MMOL/L (ref 0–18)
AST SERPL-CCNC: 140 U/L (ref ?–34)
BASOPHILS # BLD AUTO: 0.07 X10(3) UL (ref 0–0.2)
BASOPHILS NFR BLD AUTO: 0.7 %
BILIRUB SERPL-MCNC: 1.6 MG/DL (ref 0.3–1.2)
BUN BLD-MCNC: 10 MG/DL (ref 9–23)
BUN/CREAT SERPL: 13.2 (ref 10–20)
CALCIUM BLD-MCNC: 9.1 MG/DL (ref 8.7–10.4)
CHLORIDE SERPL-SCNC: 105 MMOL/L (ref 98–112)
CO2 SERPL-SCNC: 22 MMOL/L (ref 21–32)
CREAT BLD-MCNC: 0.76 MG/DL (ref 0.55–1.02)
DEPRECATED RDW RBC AUTO: 44.4 FL (ref 35.1–46.3)
EGFRCR SERPLBLD CKD-EPI 2021: 108 ML/MIN/1.73M2 (ref 60–?)
EOSINOPHIL # BLD AUTO: 0.16 X10(3) UL (ref 0–0.7)
EOSINOPHIL NFR BLD AUTO: 1.7 %
ERYTHROCYTE [DISTWIDTH] IN BLOOD BY AUTOMATED COUNT: 14.8 % (ref 11–15)
GLOBULIN PLAS-MCNC: 2.1 G/DL (ref 2–3.5)
GLUCOSE BLD-MCNC: 82 MG/DL (ref 70–99)
HCT VFR BLD AUTO: 36.6 % (ref 35–48)
HGB BLD-MCNC: 11.6 G/DL (ref 12–16)
IMM GRANULOCYTES # BLD AUTO: 0.06 X10(3) UL (ref 0–1)
IMM GRANULOCYTES NFR BLD: 0.6 %
LYMPHOCYTES # BLD AUTO: 3.09 X10(3) UL (ref 1–4)
LYMPHOCYTES NFR BLD AUTO: 32.1 %
MCH RBC QN AUTO: 25.9 PG (ref 26–34)
MCHC RBC AUTO-ENTMCNC: 31.7 G/DL (ref 31–37)
MCV RBC AUTO: 81.7 FL (ref 80–100)
MONOCYTES # BLD AUTO: 0.37 X10(3) UL (ref 0.1–1)
MONOCYTES NFR BLD AUTO: 3.8 %
NEUTROPHILS # BLD AUTO: 5.87 X10 (3) UL (ref 1.5–7.7)
NEUTROPHILS # BLD AUTO: 5.87 X10(3) UL (ref 1.5–7.7)
NEUTROPHILS NFR BLD AUTO: 61.1 %
OSMOLALITY SERPL CALC.SUM OF ELEC: 284 MOSM/KG (ref 275–295)
PLATELET # BLD AUTO: 323 10(3)UL (ref 150–450)
POTASSIUM SERPL-SCNC: 3.9 MMOL/L (ref 3.5–5.1)
POTASSIUM SERPL-SCNC: 3.9 MMOL/L (ref 3.5–5.1)
PROT SERPL-MCNC: 6.3 G/DL (ref 5.7–8.2)
RBC # BLD AUTO: 4.48 X10(6)UL (ref 3.8–5.3)
SODIUM SERPL-SCNC: 138 MMOL/L (ref 136–145)
WBC # BLD AUTO: 9.6 X10(3) UL (ref 4–11)

## 2025-06-12 PROCEDURE — 47563 LAPARO CHOLECYSTECTOMY/GRAPH: CPT

## 2025-06-12 PROCEDURE — 47563 LAPARO CHOLECYSTECTOMY/GRAPH: CPT | Performed by: STUDENT IN AN ORGANIZED HEALTH CARE EDUCATION/TRAINING PROGRAM

## 2025-06-12 PROCEDURE — 74300 X-RAY BILE DUCTS/PANCREAS: CPT | Performed by: STUDENT IN AN ORGANIZED HEALTH CARE EDUCATION/TRAINING PROGRAM

## 2025-06-12 PROCEDURE — 99232 SBSQ HOSP IP/OBS MODERATE 35: CPT | Performed by: PHYSICIAN ASSISTANT

## 2025-06-12 RX ORDER — MORPHINE SULFATE 4 MG/ML
4 INJECTION, SOLUTION INTRAMUSCULAR; INTRAVENOUS EVERY 10 MIN PRN
Status: DISCONTINUED | OUTPATIENT
Start: 2025-06-12 | End: 2025-06-12 | Stop reason: HOSPADM

## 2025-06-12 RX ORDER — ROCURONIUM BROMIDE 10 MG/ML
INJECTION, SOLUTION INTRAVENOUS AS NEEDED
Status: DISCONTINUED | OUTPATIENT
Start: 2025-06-12 | End: 2025-06-12 | Stop reason: SURG

## 2025-06-12 RX ORDER — ACETAMINOPHEN 500 MG
1000 TABLET ORAL EVERY 8 HOURS
Status: DISCONTINUED | OUTPATIENT
Start: 2025-06-12 | End: 2025-06-13

## 2025-06-12 RX ORDER — SODIUM CHLORIDE, SODIUM LACTATE, POTASSIUM CHLORIDE, CALCIUM CHLORIDE 600; 310; 30; 20 MG/100ML; MG/100ML; MG/100ML; MG/100ML
INJECTION, SOLUTION INTRAVENOUS CONTINUOUS
Status: DISCONTINUED | OUTPATIENT
Start: 2025-06-12 | End: 2025-06-12 | Stop reason: HOSPADM

## 2025-06-12 RX ORDER — OXYCODONE AND ACETAMINOPHEN 5; 325 MG/1; MG/1
1-2 TABLET ORAL EVERY 6 HOURS PRN
Qty: 5 TABLET | Refills: 0 | Status: SHIPPED | OUTPATIENT
Start: 2025-06-12

## 2025-06-12 RX ORDER — ONDANSETRON 2 MG/ML
INJECTION INTRAMUSCULAR; INTRAVENOUS AS NEEDED
Status: DISCONTINUED | OUTPATIENT
Start: 2025-06-12 | End: 2025-06-12 | Stop reason: SURG

## 2025-06-12 RX ORDER — HYDROMORPHONE HYDROCHLORIDE 1 MG/ML
0.4 INJECTION, SOLUTION INTRAMUSCULAR; INTRAVENOUS; SUBCUTANEOUS EVERY 5 MIN PRN
Status: DISCONTINUED | OUTPATIENT
Start: 2025-06-12 | End: 2025-06-12 | Stop reason: HOSPADM

## 2025-06-12 RX ORDER — HYDROCODONE BITARTRATE AND ACETAMINOPHEN 5; 325 MG/1; MG/1
2 TABLET ORAL EVERY 8 HOURS
Refills: 0 | Status: CANCELLED | OUTPATIENT
Start: 2025-06-12

## 2025-06-12 RX ORDER — HYDROCODONE BITARTRATE AND ACETAMINOPHEN 5; 325 MG/1; MG/1
1 TABLET ORAL EVERY 8 HOURS
Refills: 0 | Status: CANCELLED | OUTPATIENT
Start: 2025-06-12

## 2025-06-12 RX ORDER — GLYCOPYRROLATE 0.2 MG/ML
INJECTION, SOLUTION INTRAMUSCULAR; INTRAVENOUS AS NEEDED
Status: DISCONTINUED | OUTPATIENT
Start: 2025-06-12 | End: 2025-06-12 | Stop reason: SURG

## 2025-06-12 RX ORDER — MORPHINE SULFATE 10 MG/ML
6 INJECTION, SOLUTION INTRAMUSCULAR; INTRAVENOUS EVERY 10 MIN PRN
Status: DISCONTINUED | OUTPATIENT
Start: 2025-06-12 | End: 2025-06-12 | Stop reason: HOSPADM

## 2025-06-12 RX ORDER — LIDOCAINE HYDROCHLORIDE 40 MG/ML
SOLUTION TOPICAL AS NEEDED
Status: DISCONTINUED | OUTPATIENT
Start: 2025-06-12 | End: 2025-06-12 | Stop reason: SURG

## 2025-06-12 RX ORDER — MORPHINE SULFATE 4 MG/ML
2 INJECTION, SOLUTION INTRAMUSCULAR; INTRAVENOUS EVERY 10 MIN PRN
Status: DISCONTINUED | OUTPATIENT
Start: 2025-06-12 | End: 2025-06-12 | Stop reason: HOSPADM

## 2025-06-12 RX ORDER — HYDROMORPHONE HYDROCHLORIDE 1 MG/ML
0.2 INJECTION, SOLUTION INTRAMUSCULAR; INTRAVENOUS; SUBCUTANEOUS EVERY 5 MIN PRN
Status: DISCONTINUED | OUTPATIENT
Start: 2025-06-12 | End: 2025-06-12 | Stop reason: HOSPADM

## 2025-06-12 RX ORDER — KETOROLAC TROMETHAMINE 15 MG/ML
15 INJECTION, SOLUTION INTRAMUSCULAR; INTRAVENOUS EVERY 6 HOURS PRN
Status: DISCONTINUED | OUTPATIENT
Start: 2025-06-12 | End: 2025-06-13

## 2025-06-12 RX ORDER — BUPIVACAINE HYDROCHLORIDE 5 MG/ML
INJECTION, SOLUTION EPIDURAL; INTRACAUDAL; PERINEURAL AS NEEDED
Status: DISCONTINUED | OUTPATIENT
Start: 2025-06-12 | End: 2025-06-12 | Stop reason: HOSPADM

## 2025-06-12 RX ORDER — LIDOCAINE HYDROCHLORIDE 10 MG/ML
INJECTION, SOLUTION EPIDURAL; INFILTRATION; INTRACAUDAL; PERINEURAL AS NEEDED
Status: DISCONTINUED | OUTPATIENT
Start: 2025-06-12 | End: 2025-06-12 | Stop reason: SURG

## 2025-06-12 RX ORDER — IOPAMIDOL 612 MG/ML
INJECTION, SOLUTION INTRATHECAL AS NEEDED
Status: DISCONTINUED | OUTPATIENT
Start: 2025-06-12 | End: 2025-06-12 | Stop reason: HOSPADM

## 2025-06-12 RX ORDER — NALOXONE HYDROCHLORIDE 0.4 MG/ML
0.08 INJECTION, SOLUTION INTRAMUSCULAR; INTRAVENOUS; SUBCUTANEOUS AS NEEDED
Status: DISCONTINUED | OUTPATIENT
Start: 2025-06-12 | End: 2025-06-12 | Stop reason: HOSPADM

## 2025-06-12 RX ORDER — OXYCODONE HYDROCHLORIDE 5 MG/1
5 TABLET ORAL EVERY 4 HOURS PRN
Status: DISCONTINUED | OUTPATIENT
Start: 2025-06-12 | End: 2025-06-13

## 2025-06-12 RX ORDER — ACETAMINOPHEN 500 MG
1000 TABLET ORAL EVERY 4 HOURS PRN
Qty: 20 TABLET | Refills: 0 | Status: SHIPPED | OUTPATIENT
Start: 2025-06-12

## 2025-06-12 RX ORDER — HYDROMORPHONE HYDROCHLORIDE 1 MG/ML
0.5 INJECTION, SOLUTION INTRAMUSCULAR; INTRAVENOUS; SUBCUTANEOUS EVERY 4 HOURS PRN
Status: DISCONTINUED | OUTPATIENT
Start: 2025-06-12 | End: 2025-06-13

## 2025-06-12 RX ORDER — HYDROMORPHONE HYDROCHLORIDE 1 MG/ML
0.6 INJECTION, SOLUTION INTRAMUSCULAR; INTRAVENOUS; SUBCUTANEOUS EVERY 5 MIN PRN
Status: DISCONTINUED | OUTPATIENT
Start: 2025-06-12 | End: 2025-06-12 | Stop reason: HOSPADM

## 2025-06-12 RX ORDER — MIDAZOLAM HYDROCHLORIDE 1 MG/ML
INJECTION INTRAMUSCULAR; INTRAVENOUS AS NEEDED
Status: DISCONTINUED | OUTPATIENT
Start: 2025-06-12 | End: 2025-06-12 | Stop reason: SURG

## 2025-06-12 RX ORDER — DEXAMETHASONE SODIUM PHOSPHATE 4 MG/ML
VIAL (ML) INJECTION AS NEEDED
Status: DISCONTINUED | OUTPATIENT
Start: 2025-06-12 | End: 2025-06-12 | Stop reason: SURG

## 2025-06-12 RX ADMIN — SODIUM CHLORIDE, SODIUM LACTATE, POTASSIUM CHLORIDE, CALCIUM CHLORIDE: 600; 310; 30; 20 INJECTION, SOLUTION INTRAVENOUS at 14:25:00

## 2025-06-12 RX ADMIN — ONDANSETRON 4 MG: 2 INJECTION INTRAMUSCULAR; INTRAVENOUS at 13:52:00

## 2025-06-12 RX ADMIN — ROCURONIUM BROMIDE 40 MG: 10 INJECTION, SOLUTION INTRAVENOUS at 13:55:00

## 2025-06-12 RX ADMIN — ROCURONIUM BROMIDE 10 MG: 10 INJECTION, SOLUTION INTRAVENOUS at 13:52:00

## 2025-06-12 RX ADMIN — DEXAMETHASONE SODIUM PHOSPHATE 4 MG: 4 MG/ML VIAL (ML) INJECTION at 13:52:00

## 2025-06-12 RX ADMIN — METRONIDAZOLE 500 MG: 500 INJECTION, SOLUTION INTRAVENOUS at 14:04:00

## 2025-06-12 RX ADMIN — GLYCOPYRROLATE 0.2 MG: 0.2 INJECTION, SOLUTION INTRAMUSCULAR; INTRAVENOUS at 13:52:00

## 2025-06-12 RX ADMIN — SODIUM CHLORIDE, SODIUM LACTATE, POTASSIUM CHLORIDE, CALCIUM CHLORIDE: 600; 310; 30; 20 INJECTION, SOLUTION INTRAVENOUS at 13:49:00

## 2025-06-12 RX ADMIN — LIDOCAINE HYDROCHLORIDE 4 ML: 40 SOLUTION TOPICAL at 13:58:00

## 2025-06-12 RX ADMIN — LIDOCAINE HYDROCHLORIDE 50 MG: 10 INJECTION, SOLUTION EPIDURAL; INFILTRATION; INTRACAUDAL; PERINEURAL at 13:51:00

## 2025-06-12 RX ADMIN — MIDAZOLAM HYDROCHLORIDE 2 MG: 1 INJECTION INTRAMUSCULAR; INTRAVENOUS at 13:49:00

## 2025-06-12 NOTE — PROGRESS NOTES
[facility], [place]     Gastroenterology Progress Note    Delroy Walls Patient Status:  Inpatient    8/15/1995 MRN [id number]   Location [facility] 4W/SW/SE Attending Pete Marion MD   Hosp Day # 1 PCP None Pcp       Subjective:   Patient feels well today  Denies abdominal pain, nausea and vomiting    No CP, SOB, dizziness and light headedness  Objective:   Blood pressure 115/68, pulse 63, temperature 97.3 °F (36.3 °C), temperature source Oral, resp. rate 18, height 5' 1\" (1.549 m), weight 121 lb 4.1 oz (55 kg), SpO2 100%, currently breastfeeding. Body mass index is 22.91 kg/m².    Gen: awake, alert patient, NAD  HEENT: EOMI, the sclera appears anicteric, oropharynx clear, mucus membranes appear moist  CV: RRR  Lung: no conversational dyspnea   Abdomen: soft NTND abdomen with NABS appreciated   Skin: dry, warm, no jaundice  Ext: no LE edema is evident  Neuro: Alert and interactive  Psych: calm, cooperative    Assessment and Plan:   Delroy Walls is a 29 year old female w/ no PMHx of who presented to the ED after being advised by GI with OP labs revealing elevated LFTs.     #RUQ ABD Pain  #Elevated LFTs  -Post-prandial RUQ pain with radiation to her back x1 week, associated with nausea/vomiting.  Minimal improvement with OTC antacids/antigas medications.  -Labs reveal elevated LFTs, , , , T. Bili 2.8, remains stably elevated today.  Leukocytosis to 14, Lipase 696.   -US gallbladder with cholelithiasis and sludge within otherwise normal-appearing gallbladder.  Normal liver and pancreas.  -No prior EGD/Colonoscopy   -Etiology possible choledocholithiasis v gallstone pancreatitis v passed sludge stone.  Hepatitis panel negative for acute viral infection.  -MRCP with tiny distal common bile duct stones, no dilation of biliary tree  -plan for surgery and IOC, GI will continue to follow    Recommend:  -Empiric PPI  -surgery following, plans for CCY today with IOC  -IVF,  pain regimen and antiemetics per primary  -Trend LFTs daily while admitted    Case discussed with Ashleigh Carty MD.     Ayaka Blum PA-C  [facility] Gastroenterology  6/12/2025      Results:     Lab Results   Component Value Date    WBC 9.6 06/12/2025    HGB 11.6 (L) 06/12/2025    HCT 36.6 06/12/2025    .0 06/12/2025    CREATSERUM 0.76 06/12/2025    BUN 10 06/12/2025     06/12/2025    K 3.9 06/12/2025    K 3.9 06/12/2025     06/12/2025    CO2 22.0 06/12/2025    GLU 82 06/12/2025    CA 9.1 06/12/2025    ALB 4.2 06/12/2025    ALKPHO 564 (H) 06/12/2025    BILT 1.6 (H) 06/12/2025    TP 6.3 06/12/2025     (H) 06/12/2025     (H) 06/12/2025     (H) 06/11/2025       MRI ABDOMEN AND MRCP W/3D (GSP=87105/82292)  Result Date: 6/11/2025  CONCLUSION:  1. Tiny distal common bile duct stones .  No dilatation of biliary tree. 2. Cholelithiasis with cholecystitis. 3. Mild interstitial pancreatitis in pancreatic head and uncinate with small amount of surrounding fluid. 4. Evaluation slightly degraded by patient related motion artifact.    Dictated by (CST): Christoph Silva MD on 6/11/2025 at 5:47 PM     Finalized by (CST): Christoph Silva MD on 6/11/2025 at 5:56 PM          US GALLBLADDER (CPT=76705)  Result Date: 6/11/2025  CONCLUSION:   1. Cholelithiasis and sludge within an otherwise normal-appearing gallbladder.  There is no sonographic evidence of acute cholecystitis.  2. Normal sonographic appearance of the liver, right kidney, and pancreas.    Dictated by (CST): Ismael Edmonds MD on 6/11/2025 at 11:13 AM     Finalized by (CST): Ismael Edmonds MD on 6/11/2025 at 11:17 AM

## 2025-06-12 NOTE — ANESTHESIA PROCEDURE NOTES
Airway  Date/Time: 6/12/2025 1:58 PM  Reason: elective    Airway not difficult    General Information and Staff   Patient location during procedure: OR  Resident/CRNA: Perlita Almendarez CRNA  Performed: CRNA   Performed by: Perlita Almendarez CRNA  Authorized by: Aristides Arreola MD        Indications and Patient Condition  Indications for airway management: anesthesia  Sedation level: deep      Preoxygenated: yesPatient position: sniffing  MILS maintained throughout    Mask difficulty assessment: 1 - vent by mask  No planned trial extubation    Final Airway Details    Final airway type: endotracheal airway    Successful airway: ETT  Cuffed: yes   Successful intubation technique: direct laryngoscopy  Facilitating devices/methods: cricoid pressure and intubating stylet  Endotracheal tube insertion site: oral  Blade: Shayy  Blade size: #3  ETT size (mm): 7.5    Cormack-Lehane Classification: grade IIA - partial view of glottis  Placement verified by: capnometry   Cuff volume (mL): 6  Measured from: lips  ETT to lips (cm): 22  Number of attempts at approach: 1  Number of other approaches attempted: 0

## 2025-06-12 NOTE — ANESTHESIA PREPROCEDURE EVALUATION
Anesthesia PreOp Note    HPI:     Shae Walls is a 29 year old female who presents for preoperative consultation requested by: Meghan Terry MD    Date of Surgery: 6/11/2025 - 6/12/2025    Procedure(s):  LAPAROSCOPIC CHOLECYSTECTOMY WITH INTRA-OPERATIVE CHOLANGIOGRAM  Indication: Calculus of gallbladder with biliary obstruction    Relevant Problems   No relevant active problems       NPO:  Last Liquid Consumption Date: 06/11/25  Last Liquid Consumption Time: 2330  Last Solid Consumption Date: 06/11/25  Last Solid Consumption Time: 2330  Last Liquid Consumption Date: 06/11/25          History Review:  Patient Active Problem List    Diagnosis Date Noted    Epigastric pain 06/11/2025    Calculus of gallbladder with biliary obstruction but without cholecystitis 06/11/2025       Past Medical History[1]    Past Surgical History[2]    Prescriptions Prior to Admission[3]  Current Medications and Prescriptions Ordered in Epic[4]    Allergies[5]    Family History[6]  Social Hx on file[7]    Available pre-op labs reviewed.  Lab Results   Component Value Date    WBC 9.6 06/12/2025    RBC 4.48 06/12/2025    HGB 11.6 (L) 06/12/2025    HCT 36.6 06/12/2025    MCV 81.7 06/12/2025    MCH 25.9 (L) 06/12/2025    MCHC 31.7 06/12/2025    RDW 14.8 06/12/2025    .0 06/12/2025    URINEPREG Negative 06/11/2025     Lab Results   Component Value Date     06/12/2025    K 3.9 06/12/2025    K 3.9 06/12/2025     06/12/2025    CO2 22.0 06/12/2025    BUN 10 06/12/2025    CREATSERUM 0.76 06/12/2025    GLU 82 06/12/2025    CA 9.1 06/12/2025          Vital Signs:  Body mass index is 22.91 kg/m².   height is 1.549 m (5' 1\") and weight is 55 kg (121 lb 4.1 oz). Her oral temperature is 97.3 °F (36.3 °C). Her blood pressure is 115/68 and her pulse is 63. Her respiration is 18 and oxygen saturation is 100%.   Vitals:    06/11/25 1506 06/11/25 1857 06/11/25 1930 06/12/25 0248   BP: 101/64 129/88 121/84 115/68   Pulse: 58 58 51 63   Resp:  18 18 16 18   Temp: 98.6 °F (37 °C) 98.2 °F (36.8 °C) 97.6 °F (36.4 °C) 97.3 °F (36.3 °C)   TempSrc: Oral Oral Oral Oral   SpO2: 100% 100% 100% 100%   Weight:       Height:            Anesthesia Evaluation     Patient summary reviewed and Nursing notes reviewed    No history of anesthetic complications   Airway   Mallampati: I  TM distance: >3 FB  Neck ROM: full  Dental      Pulmonary - negative ROS and normal exam   Cardiovascular - negative ROS and normal exam    Neuro/Psych - negative ROS     GI/Hepatic/Renal      Comments: Cholecystitis     Endo/Other    Abdominal                  Anesthesia Plan:   ASA:  2  Plan:   General  Informed Consent Plan and Risks Discussed With:  Patient      I have informed Shae Walls and/or legal guardian or family member of the nature of the anesthetic plan, benefits, risks including possible dental damage if relevant, major complications, and any alternative forms of anesthetic management.   All of the patient's questions were answered to the best of my ability. The patient desires the anesthetic management as planned.  Aristides Arreola MD  2025 1:11 PM  Present on Admission:  **None**           [1] History reviewed. No pertinent past medical history.  [2]   Past Surgical History:  Procedure Laterality Date         [3]   Medications Prior to Admission   Medication Sig Dispense Refill Last Dose/Taking    famotidine 40 MG Oral Tab Take 1 tablet (40 mg total) by mouth at bedtime.   6/10/2025 at  9:00 PM    RABEprazole Sodium 20 MG Oral Tab EC Take 1 tablet (20 mg total) by mouth daily.   2025 at  7:00 AM    simethicone (GAS-X EXTRA STRENGTH) 125 MG Oral Chew Tab Chew 1 tablet (125 mg total) by mouth every 6 (six) hours as needed.   2025 at  7:30 AM    Prenatal 27-1 MG Oral Tab Take 1 tablet by mouth daily.   2025 at  7:30 AM    folic acid 1 MG Oral Tab Take 1 tablet (1 mg total) by mouth daily.   6/10/2025   [4]   Current Facility-Administered  Medications Ordered in Epic   Medication Dose Route Frequency Provider Last Rate Last Admin    lactated ringers infusion   Intravenous Continuous Ashleigh Carty  mL/hr at 06/11/25 1527 Rate Change at 06/11/25 1527    [Transfer Hold] acetaminophen (Tylenol) tab 650 mg  650 mg Oral Q4H PRN Pete Marion MD        Or    [Transfer Hold] HYDROcodone-acetaminophen (Norco) 5-325 MG per tab 1 tablet  1 tablet Oral Q4H PRN Pete Marion MD        Or    [Transfer Hold] HYDROcodone-acetaminophen (Norco) 5-325 MG per tab 2 tablet  2 tablet Oral Q4H PRN Pete Marion MD        [Transfer Hold] morphINE PF 2 MG/ML injection 1 mg  1 mg Intravenous Q2H PRN Pete Marion MD        Or    [Transfer Hold] morphINE PF 2 MG/ML injection 2 mg  2 mg Intravenous Q2H PRN Pete Marion MD        Or    [Transfer Hold] morphINE PF 4 MG/ML injection 4 mg  4 mg Intravenous Q2H PRN Pete Marion MD        [Transfer Hold] polyethylene glycol (PEG 3350) (Miralax) 17 g oral packet 17 g  17 g Oral Daily PRN Pete Marion MD        [Transfer Hold] sennosides (Senokot) tab 17.2 mg  17.2 mg Oral Nightly PRN Pete Marion MD        [Transfer Hold] bisacodyl (Dulcolax) 10 MG rectal suppository 10 mg  10 mg Rectal Daily PRN Pete Marion MD        [Transfer Hold] fleet enema (Fleet) rectal enema 133 mL  1 enema Rectal Once PRN Pete Marion MD        [Transfer Hold] ondansetron (Zofran) 4 MG/2ML injection 4 mg  4 mg Intravenous Q6H PRN Pete Marion MD        [Transfer Hold] prochlorperazine (Compazine) 10 MG/2ML injection 5 mg  5 mg Intravenous Q8H PRN Pete Marion MD        [Transfer Hold] pantoprazole (Protonix) 40 mg in sodium chloride 0.9% PF 10 mL IV push  40 mg Intravenous Daily Pete Marion MD   40 mg at 06/12/25 1007    cefTRIAXone (Rocephin) 1 g in sodium chloride 0.9% 100 mL IVPB-ADDV  1 g Intravenous Q24H Pete Marion  mL/hr at 06/12/25 1209 1 g at 06/12/25 1209    metroNIDAZOLE in sodium chloride 0.79%  (Flagyl) 5 mg/mL IVPB premix 500 mg  500 mg Intravenous Q8H Pete Marion  mL/hr at 06/12/25 0515 500 mg at 06/12/25 0515     No current [facility]-ordered outpatient medications on file.   [5] No Known Allergies  [6]   Family History  Problem Relation Age of Onset    Colon Cancer Neg    [7]   Social History  Socioeconomic History    Marital status:    Tobacco Use    Smoking status: Never     Passive exposure: Never    Smokeless tobacco: Never   Substance and Sexual Activity    Drug use: Never

## 2025-06-12 NOTE — PLAN OF CARE
Received patient from [facility] at end of shift, vitals taken. Denies pain or n/v, endorsed care to PM RNFelicia

## 2025-06-12 NOTE — PROGRESS NOTES
[facility]  Progress Note    Delroy Walls Patient Status:  Inpatient    8/15/1995 MRN [id number]   Location [facility] 4W/SW/SE Attending Pete Marion MD   Hosp Day # 1 PCP None Pcp     Subjective:  Patient resting comfortably in bed this morning.  Patient denies any abdominal pain today.  Denies nausea or emesis.  Tolerated clear liquids yesterday.  Denies fever or chills.    Objective/Physical Exam:  General: Alert, orientated x3.  Cooperative.  No apparent distress.  Vital Signs:  Blood pressure 115/68, pulse 63, temperature 97.3 °F (36.3 °C), temperature source Oral, resp. rate 18, height 5' 1\" (1.549 m), weight 121 lb 4.1 oz (55 kg), SpO2 100%, currently breastfeeding.  Wt Readings from Last 3 Encounters:   25 121 lb 4.1 oz (55 kg)   06/10/25 120 lb (54.4 kg)     Lungs: No respiratory distress.  Cardiac: Regular rate and rhythm.   Abdomen:  Soft, non distended, non tender, with no rebound or guarding.  No peritoneal signs.   Extremities:  No lower extremity edema noted.      Intake/Output:  No intake or output data in the 24 hours ending 25 0849  No intake/output data recorded.  No intake/output data recorded.    Medications: Scheduled Medications[1]    Labs:  Lab Results   Component Value Date    WBC 9.6 2025    HGB 11.6 2025    HCT 36.6 2025    .0 2025     Lab Results   Component Value Date     2025    K 3.9 2025    K 3.9 2025     2025    CO2 22.0 2025    BUN 10 2025    CREATSERUM 0.76 2025    GLU 82 2025    CA 9.1 2025    ALKPHO 564 2025     2025     2025    BILT 1.6 2025    ALB 4.2 2025    TP 6.3 2025     No results found for: \"PT\", \"INR\"      Assessment  Problem List[2]    Cholelithiasis, suspected passed common bile duct stones  Transaminitis, downtrending    Plan:  MRCP yesterday showed small distal common bile duct  stones without biliary tree dilation. Downtrending LFTs, suspected passed CBD stones  Plan for laparoscopic cholecystectomy today with possible cholangiogram  NPO this morning for surgery  Analgesics and antiemetics as needed  Ambulate and up to chair  GI prophylaxis with Protonix    Quality:  DVT Mechanical Prophylaxis:   SCDs, Early ambuation  DVT Pharmacologic Prophylaxis   Medication   None                Code Status: Full Code  Manuel: No urinary catheter in place  Manuel Duration (in days):   Central line:    HERMAN: 6/13/2025        Nat Bhatia PA-C  6/12/2025  8:49 AM               [1]    pantoprazole  40 mg Intravenous Daily    cefTRIAXone  1 g Intravenous Q24H    metroNIDAZOLE  500 mg Intravenous Q8H   [2]   Patient Active Problem List  Diagnosis    Epigastric pain    Calculus of gallbladder with biliary obstruction but without cholecystitis

## 2025-06-12 NOTE — DISCHARGE INSTRUCTIONS
POST-OPERATIVE INSTRUCTIONS LAPAROSCOPIC/ROBOTIC SURGERY    Thank you very much for allowing me to participate in your care, it is truly a privilege. Below please see discharge orders that will help you during your recovery. Please do not hesitate to call the office at [phone] for any questions. After hours, the same number will allow you to reach the on-call surgeon.     Call the office [phone] to schedule your 2 week post-operative appointment with Dr. Terry or her Physician Assistant (PA).    Change bandages daily or more frequently if needed.  Keep incisions clean with soap/ water daily.   Cover incision(s) as needed.  If you have skin glue this will come off on its own    May place an ice pack over your incisions on and off (15min) at a time for the next 24-48 hours.    Resume regular diet as tolerated (recommend starting with liquids)    General guidelines for activity:      Avoid strenuous activity or lifting anything heavier than 15 pounds.    It is OK to be up and walking around. Going up and down stairs is also OK.    Do what is comfortable: stop and rest when you feel tired.    It is OK to shower after 24 hours    You will have pain medicine ordered. Take as directed/needed.    Some discomfort, mild bruising, and swelling are not unusual; please call my office if you have any severe pain, bleeding, or high fever (over 101°F)    During the robotic/laparoscopic procedure that you had, gas is pumped into the abdominal cavity.  You may feel abdominal, shoulder, or rib pain for a few days due to this.    Resume home medications (see medication reconciliation sheet)       Do NOT drive for one day and while taking your narcotic pain medicine.        Watch for signs of infection:    Excessive warmth or bright redness around your incisions    Leakage of bloody or cloudy fluid from you incisions    Fever over 100.5    If you experience constipation  Increase your water intake.  Increase your activity;  walking is best.  An over the counter stool softener or mild laxative may be necessary if you still have not had a bowel movement after several days.       Please call the office at [phone] for any questions and to make your post op appointment if needed. Thank you again for allowing me to participate in your care, and get well soon!      Meghan Terry MD  [facility] - General Surgery   [address] [place] [address]  [place]  p [phone]

## 2025-06-12 NOTE — OPERATIVE REPORT
OPERATIVE NOTE    PATIENT NAME: Shae Walls    :  8/15/1995    MRN:  [id number]  ATTENDING PHYSICIAN:  Pete Marion MD    PROCEDURE DATE:  2025       PREOPERATIVE DIAGNOSIS:   Choledocholithiasis  Pancreatitis     POSTOPERATIVE DIAGNOSIS: Same, plus acute cholecystitis     SURGEON: Meghan Terry MD    ASSISTANT: Nat Chavez PA-C     OPERATION: Laparoscopic cholecystectomy with intraoperative cholangiogram     ANESTHESIA: General    ESTIMATED BLOOD LOSS:  5 ml      COMPLICATIONS: None     SPECIMENS: Was Obtained: Gallbladder    OPERATIVE FINDINGS: negative IOC        INDICATIONS: The patient is a 29 year old year old female with history of above preop diagnosis.  I explained the risk, benefits, expected outcome, and alternatives to the procedure. Patient understands the risks include but not inclusive to bleeding, infection, bile leak, bile duct injury, diarrhea, chronic pain, and persistent symptoms.  She indicates understanding and wishes to proceed with surgery.     DESCRIPTION OF PROCEDURE:    The patient was brought to the operating room and placed in supine position.  After initiation of general anesthesia by the Anesthesia Department, the abdomen was prepped and draped normal sterile fashion.  A Veress needle was placed in the left upper quadrant and the abdomen was insufflated without difficulty to a pressure of 15 mmhg.  We placed three 5 mm trocars, one in the left upper quadrant, 2 in the right upper quadrant, and 11 mm trocar in the supraumbilical position.    Upon entering the abdomen, the gallbladder was identified, grasped, and retracted cephalad.  The peritoneum overlying the junction of the cystic duct was stripped free.  Hook electrocautery was used to detach the lateral peritoneal attachments and the left and right side of the gallbladder up towards the fundus.  We isolated the cystic duct and cystic artery and dissected the posterior aspect of the gallbladder from the gallbladder  fossa.  There were only 2 structures noted, the cystic duct and the cystic artery.  The critical view of safety was obtained. A clip was placed on the specimen side of the gallbladder. A partial cystotomy was created with scissors. The cholangiogram catheter was advanced over a wire into the cystic duct. Cholangiogram with 50/50 contrast was obtained under fluoroscopy. There was no obstruction of the common bile duct and contrast was seen in the duodenum. The catheter was removed. Two additional clips were placed on the stay side of the gallbladder and the cystic duct was divided. The cystic artery was clipped and divided.      Hook electrocautery was used to remove the gallbladder from the gallbladder fossa.  Prior to amputating the gallbladder, we again re-evaluated our clip structures, there was no evidence of bleeding or bilious extravasation.  The gallbladder was amputated and removed using a wound protection device.  Multiple 0 Vicryl sutures were used to close the fascial defect at the gallbladder extraction site. Local anesthetic was injected into the TAP plane along each trocar site and the skin incision were also anesthetized. The skin was closed using 4-0 Monocryl suture.  All instrument and sponge counts were correct at the end of the case. I was present and scrubbed for the entire operation. The patient tolerated the procedure well, was extubated, and sent to the recovery room in stable condition.           Meghan Terry MD  [facility] - General Surgery   [address] [place] [address]  p [phone]

## 2025-06-12 NOTE — PROGRESS NOTES
[facility] Hospitalist Progress Note     CC: Hospital Follow up    PCP: None Pcp       Assessment/Plan:     Principal Problem:    Epigastric pain  Active Problems:    Calculus of gallbladder with biliary obstruction but without cholecystitis    Patient is a 29 year old female with no sig PMH sig presents with abdominal pain.  Admitted for cholelithiasis and choledocholithiasis.     Cholelithiasis  Choledocholithiasis  Transaminitis  Hyperbilirubinemia  Elevated lipase  Leukocytosis   - Patient presents with epigastric and right upper quadrant pain with nausea  - afebrile, normotensive on room air  - WBC 14.2, hemoglobin 13  - ,  5, total bili 2.2, lipase 696  - Abdominal ultrasound: Cholelithiasis and sludge within an otherwise normal-appearing gallbladder.  There is no sonographic evidence of acute cholecystitis.  Normal sonographic appearance of the liver, right kidney, and pancreas   - MRCP with small CBD stone with no dilation   - IVF, pain control and antiemetics, PPI  - Ceftriaxone and Flagyl  - GI and general surgery on consult  - plans for cholecystectomy      GERD  - PPI     FN:  - IVF: LR  - Diet: NPO     DVT Prophy: SCDs  Atrophy: Ambulate   Lines: Piv     Dispo: pending clinical course     Outpatient records or previous hospital records reviewed.      Further recommendations pending patient's clinical course.  [facility] hospitalist to continue to follow patient while in house     Patient and/or patient's family given opportunity to ask questions and note understanding and agreeing with therapeutic plan as outlined     Thank You,  Pete Marion MD     [facility] Hospitalist  Answering Service number: [phone]     Subjective:     No CP, SOB, or N/V.  Pain is much better.   and baby at bedside.     OBJECTIVE:    Blood pressure 115/68, pulse 63, temperature 97.3 °F (36.3 °C), temperature source Oral, resp. rate 18, height 5' 1\" (1.549 m), weight 121 lb 4.1 oz (55 kg), SpO2 100%,  currently breastfeeding.    Temp:  [97.3 °F (36.3 °C)-98.6 °F (37 °C)] 97.3 °F (36.3 °C)  Pulse:  [51-63] 63  Resp:  [16-18] 18  BP: (101-129)/(64-88) 115/68  SpO2:  [100 %] 100 %      Intake/Output:  No intake or output data in the 24 hours ending 06/12/25 1144    Last 3 Weights   06/11/25 1236 121 lb 4.1 oz (55 kg)   06/10/25 1659 120 lb (54.4 kg)       Exam   General: Alert, no acute distress  Lungs: clear to ausculation bilaterally  Heart: Regular rate and rhythm  Abdomen: soft, non tender, non distended   Extremities: No edema  Skin: no new rash, normal color  Neuro: 5/5 strength in bilateral extremities, normal sensations  Psych: appropriate affect       Data Review:       Labs:     Recent Labs   Lab 06/10/25  1740 06/11/25  1025 06/12/25  0632   RBC 5.23 5.13 4.48   HGB 13.2 13.0 11.6*   HCT 41.8 40.8 36.6   MCV 79.9* 79.5* 81.7   MCH 25.2* 25.3* 25.9*   MCHC 31.6 31.9 31.7   RDW 15.0 15.1* 14.8   NEPRELIM 10.35* 9.15* 5.87   WBC 13.9* 14.2* 9.6   .0* 434.0 323.0         Recent Labs   Lab 06/11/25  1025 06/12/25  0632   GLU 89 82   BUN 7* 10   CREATSERUM 0.87 0.76   EGFRCR 92 108   CA 9.7 9.1    138   K 3.4* 3.9  3.9    105   CO2 25.0 22.0       Recent Labs   Lab 06/10/25  1740 06/11/25  1025 06/12/25  0632   * 535* 378*   * 263* 140*   ALB 4.9* 5.2* 4.2         Imaging:  MRI ABDOMEN AND MRCP W/3D (ITD=18899/17164)  Result Date: 6/11/2025  CONCLUSION:  1. Tiny distal common bile duct stones .  No dilatation of biliary tree. 2. Cholelithiasis with cholecystitis. 3. Mild interstitial pancreatitis in pancreatic head and uncinate with small amount of surrounding fluid. 4. Evaluation slightly degraded by patient related motion artifact.    Dictated by (CST): Christoph Silva MD on 6/11/2025 at 5:47 PM     Finalized by (CST): Christoph Silva MD on 6/11/2025 at 5:56 PM          US GALLBLADDER (CPT=76705)  Result Date: 6/11/2025  CONCLUSION:   1. Cholelithiasis and sludge within an  otherwise normal-appearing gallbladder.  There is no sonographic evidence of acute cholecystitis.  2. Normal sonographic appearance of the liver, right kidney, and pancreas.    Dictated by (CST): Ismael Edmonds MD on 6/11/2025 at 11:13 AM     Finalized by (CST): Ismael Edmonds MD on 6/11/2025 at 11:17 AM              Meds:     Scheduled Medications[1]  Medication Infusions[2]  PRN Medications[3]           [1]    pantoprazole  40 mg Intravenous Daily    cefTRIAXone  1 g Intravenous Q24H    metroNIDAZOLE  500 mg Intravenous Q8H   [2]    lactated ringers 150 mL/hr at 06/11/25 1527   [3]   acetaminophen **OR** HYDROcodone-acetaminophen **OR** HYDROcodone-acetaminophen    morphINE **OR** morphINE **OR** morphINE    polyethylene glycol (PEG 3350)    sennosides    bisacodyl    fleet enema    ondansetron    prochlorperazine

## 2025-06-12 NOTE — PLAN OF CARE
No acute changes over night. Pt is alert and oriented on RA. Pt is voiding freely. Pt is tolerating a CLD. Pt denies nausea or vomiting. Pt is ambulating independently. Call light is within reach and safety measures are in place.    Problem: GASTROINTESTINAL - ADULT  Goal: Minimal or absence of nausea and vomiting  Description: INTERVENTIONS:  - Maintain adequate hydration with IV or PO as ordered and tolerated  - Nasogastric tube to low intermittent suction as ordered  - Evaluate effectiveness of ordered antiemetic medications  - Provide nonpharmacologic comfort measures as appropriate  - Advance diet as tolerated, if ordered  - Obtain nutritional consult as needed  - Evaluate fluid balance  Outcome: Progressing  Goal: Maintains or returns to baseline bowel function  Description: INTERVENTIONS:  - Assess bowel function  - Maintain adequate hydration with IV or PO as ordered and tolerated  - Evaluate effectiveness of GI medications  - Encourage mobilization and activity  - Obtain nutritional consult as needed  - Establish a toileting routine/schedule  - Consider collaborating with pharmacy to review patient's medication profile  Outcome: Progressing     Problem: PAIN - ADULT  Goal: Verbalizes/displays adequate comfort level or patient's stated pain goal  Description: INTERVENTIONS:  - Encourage pt to monitor pain and request assistance  - Assess pain using appropriate pain scale  - Administer analgesics based on type and severity of pain and evaluate response  - Implement non-pharmacological measures as appropriate and evaluate response  - Consider cultural and social influences on pain and pain management  - Manage/alleviate anxiety  - Utilize distraction and/or relaxation techniques  - Monitor for opioid side effects  - Notify MD/LIP if interventions unsuccessful or patient reports new pain  - Anticipate increased pain with activity and pre-medicate as appropriate  Outcome: Progressing     Problem: RISK FOR  INFECTION - ADULT  Goal: Absence of fever/infection during anticipated neutropenic period  Description: INTERVENTIONS  - Monitor WBC  - Administer growth factors as ordered  - Implement neutropenic guidelines  Outcome: Progressing     Problem: SAFETY ADULT - FALL  Goal: Free from fall injury  Description: INTERVENTIONS:  - Assess pt frequently for physical needs  - Identify cognitive and physical deficits and behaviors that affect risk of falls.  - [place] fall precautions as indicated by assessment.  - Educate pt/family on patient safety including physical limitations  - Instruct pt to call for assistance with activity based on assessment  - Modify environment to reduce risk of injury  - Provide assistive devices as appropriate  - Consider OT/PT consult to assist with strengthening/mobility  - Encourage toileting schedule  Outcome: Progressing     Problem: DISCHARGE PLANNING  Goal: Discharge to home or other facility with appropriate resources  Description: INTERVENTIONS:  - Identify barriers to discharge w/pt and caregiver  - Include patient/family/discharge partner in discharge planning  - Arrange for needed discharge resources and transportation as appropriate  - Identify discharge learning needs (meds, wound care, etc)  - Arrange for interpreters to assist at discharge as needed  - Consider post-discharge preferences of patient/family/discharge partner  - Complete POLST form as appropriate  - Assess patient's ability to be responsible for managing their own health  - Refer to Case Management Department for coordinating discharge planning if the patient needs post-hospital services based on physician/LIP order or complex needs related to functional status, cognitive ability or social support system  Outcome: Progressing     Problem: Altered Communication/Language Barrier  Goal: Patient/Family is able to understand and participate in their care  Description: Interventions:  - Assess communication ability and  preferred communication style  - Implement communication aides and strategies  - Use visual cues when possible  - Listen attentively, be patient, do not interrupt  - Minimize distractions  - Allow time for understanding and response  - Establish method for patient to ask for assistance (call light)  - Provide an  as needed  - Communicate barriers and strategies to overcome with those who interact with patient  Outcome: Progressing

## 2025-06-12 NOTE — ANESTHESIA POSTPROCEDURE EVALUATION
Patient: Shae aWlls    Procedure Summary       Date: 06/12/25 Room / Location: [facility] MAIN OR  / [facility] MAIN OR    Anesthesia Start: 1349 Anesthesia Stop: 1523    Procedure: LAPAROSCOPIC CHOLECYSTECTOMY WITH INTRA-OPERATIVE CHOLANGIOGRAM (Abdomen) Diagnosis: (Calculus of gallbladder with biliary obstruction)    Surgeons: Meghan Terry MD Anesthesiologist: Bk Ramirez MD    Anesthesia Type: general ASA Status: 2            Anesthesia Type: general    Vitals Value Taken Time   /98 06/12/25 15:32   Temp 97.8 °F (36.6 °C) 06/12/25 15:22   Pulse 83 06/12/25 15:34   Resp 16 06/12/25 15:34   SpO2 98 % 06/12/25 15:34   Vitals shown include unfiled device data.    [facility] AN Post Evaluation:   Patient Evaluated in PACU  Patient Participation: complete - patient participated  Level of Consciousness: awake  Pain Management: adequate  Airway Patency:patent  Yes    Nausea/Vomiting: none  Cardiovascular Status: acceptable  Respiratory Status: acceptable  Postoperative Hydration acceptable      Aristides Arreola MD  6/12/2025 3:35 PM

## 2025-06-13 VITALS
HEIGHT: 61 IN | RESPIRATION RATE: 18 BRPM | WEIGHT: 121.25 LBS | BODY MASS INDEX: 22.89 KG/M2 | TEMPERATURE: 99 F | OXYGEN SATURATION: 96 % | DIASTOLIC BLOOD PRESSURE: 75 MMHG | HEART RATE: 56 BPM | SYSTOLIC BLOOD PRESSURE: 109 MMHG

## 2025-06-13 LAB
ALBUMIN SERPL-MCNC: 4.2 G/DL (ref 3.2–4.8)
ALBUMIN/GLOB SERPL: 1.9 {RATIO} (ref 1–2)
ALP LIVER SERPL-CCNC: 496 U/L (ref 37–98)
ALT SERPL-CCNC: 309 U/L (ref 10–49)
ANION GAP SERPL CALC-SCNC: 8 MMOL/L (ref 0–18)
AST SERPL-CCNC: 98 U/L (ref ?–34)
BASOPHILS # BLD AUTO: 0.03 X10(3) UL (ref 0–0.2)
BASOPHILS NFR BLD AUTO: 0.2 %
BILIRUB SERPL-MCNC: 1.1 MG/DL (ref 0.3–1.2)
BUN BLD-MCNC: 7 MG/DL (ref 9–23)
BUN/CREAT SERPL: 10 (ref 10–20)
CALCIUM BLD-MCNC: 9 MG/DL (ref 8.7–10.4)
CHLORIDE SERPL-SCNC: 103 MMOL/L (ref 98–112)
CO2 SERPL-SCNC: 26 MMOL/L (ref 21–32)
CREAT BLD-MCNC: 0.7 MG/DL (ref 0.55–1.02)
DEPRECATED RDW RBC AUTO: 41.4 FL (ref 35.1–46.3)
EGFRCR SERPLBLD CKD-EPI 2021: 119 ML/MIN/1.73M2 (ref 60–?)
EOSINOPHIL # BLD AUTO: 0.02 X10(3) UL (ref 0–0.7)
EOSINOPHIL NFR BLD AUTO: 0.1 %
ERYTHROCYTE [DISTWIDTH] IN BLOOD BY AUTOMATED COUNT: 14.8 % (ref 11–15)
GLOBULIN PLAS-MCNC: 2.2 G/DL (ref 2–3.5)
GLUCOSE BLD-MCNC: 103 MG/DL (ref 70–99)
HCT VFR BLD AUTO: 35.5 % (ref 35–48)
HGB BLD-MCNC: 11.7 G/DL (ref 12–16)
IMM GRANULOCYTES # BLD AUTO: 0.08 X10(3) UL (ref 0–1)
IMM GRANULOCYTES NFR BLD: 0.6 %
LYMPHOCYTES # BLD AUTO: 2.48 X10(3) UL (ref 1–4)
LYMPHOCYTES NFR BLD AUTO: 17.3 %
MCH RBC QN AUTO: 25.6 PG (ref 26–34)
MCHC RBC AUTO-ENTMCNC: 33 G/DL (ref 31–37)
MCV RBC AUTO: 77.7 FL (ref 80–100)
MONOCYTES # BLD AUTO: 0.7 X10(3) UL (ref 0.1–1)
MONOCYTES NFR BLD AUTO: 4.9 %
NEUTROPHILS # BLD AUTO: 11.06 X10 (3) UL (ref 1.5–7.7)
NEUTROPHILS # BLD AUTO: 11.06 X10(3) UL (ref 1.5–7.7)
NEUTROPHILS NFR BLD AUTO: 76.9 %
OSMOLALITY SERPL CALC.SUM OF ELEC: 282 MOSM/KG (ref 275–295)
PLATELET # BLD AUTO: 389 10(3)UL (ref 150–450)
POTASSIUM SERPL-SCNC: 3.6 MMOL/L (ref 3.5–5.1)
PROT SERPL-MCNC: 6.4 G/DL (ref 5.7–8.2)
RBC # BLD AUTO: 4.57 X10(6)UL (ref 3.8–5.3)
SODIUM SERPL-SCNC: 137 MMOL/L (ref 136–145)
WBC # BLD AUTO: 14.4 X10(3) UL (ref 4–11)

## 2025-06-13 NOTE — PLAN OF CARE
Shae is alert and oriented x 4. She is up stand by in the room,  at bedside. Patient is passing gas and tolerating meals with no nausea or vomiting. Discharge education complete, IV removed.  Problem: Patient Centered Care  Goal: Patient preferences are identified and integrated in the patient's plan of care  Description: Interventions:  - What would you like us to know as we care for you?   - Provide timely, complete, and accurate information to patient/family  - Incorporate patient and family knowledge, values, beliefs, and cultural backgrounds into the planning and delivery of care  - Encourage patient/family to participate in care and decision-making at the level they choose  - Honor patient and family perspectives and choices  6/13/2025 1334 by Artur Beckwith RN  Outcome: Adequate for Discharge  6/13/2025 1333 by Artur Beckwith RN  Outcome: Progressing     Problem: Patient/Family Goals  Goal: Patient/Family Long Term Goal  Description: Patient's Long Term Goal:     Interventions:  - See additional Care Plan goals for specific interventions  6/13/2025 1334 by Artur Beckwith RN  Outcome: Adequate for Discharge  6/13/2025 1333 by Artur Beckwith RN  Outcome: Progressing  Goal: Patient/Family Short Term Goal  Description: Patient's Short Term Goal:     Interventions:   - See additional Care Plan goals for specific interventions  6/13/2025 1334 by Artur Beckwith RN  Outcome: Adequate for Discharge  6/13/2025 1333 by Artur Beckwith RN  Outcome: Progressing     Problem: GASTROINTESTINAL - ADULT  Goal: Minimal or absence of nausea and vomiting  Description: INTERVENTIONS:  - Maintain adequate hydration with IV or PO as ordered and tolerated  - Nasogastric tube to low intermittent suction as ordered  - Evaluate effectiveness of ordered antiemetic medications  - Provide nonpharmacologic comfort measures as appropriate  - Advance diet as tolerated, if ordered  - Obtain nutritional consult as needed  - Evaluate fluid  balance  6/13/2025 1334 by Artur Beckwith RN  Outcome: Adequate for Discharge  6/13/2025 1333 by Artur Beckwith RN  Outcome: Progressing  Goal: Maintains or returns to baseline bowel function  Description: INTERVENTIONS:  - Assess bowel function  - Maintain adequate hydration with IV or PO as ordered and tolerated  - Evaluate effectiveness of GI medications  - Encourage mobilization and activity  - Obtain nutritional consult as needed  - Establish a toileting routine/schedule  - Consider collaborating with pharmacy to review patient's medication profile  6/13/2025 1334 by Artur Beckwith RN  Outcome: Adequate for Discharge  6/13/2025 1333 by Artur Beckwith RN  Outcome: Progressing     Problem: PAIN - ADULT  Goal: Verbalizes/displays adequate comfort level or patient's stated pain goal  Description: INTERVENTIONS:  - Encourage pt to monitor pain and request assistance  - Assess pain using appropriate pain scale  - Administer analgesics based on type and severity of pain and evaluate response  - Implement non-pharmacological measures as appropriate and evaluate response  - Consider cultural and social influences on pain and pain management  - Manage/alleviate anxiety  - Utilize distraction and/or relaxation techniques  - Monitor for opioid side effects  - Notify MD/LIP if interventions unsuccessful or patient reports new pain  - Anticipate increased pain with activity and pre-medicate as appropriate  6/13/2025 1334 by Artur Beckwith RN  Outcome: Adequate for Discharge  6/13/2025 1333 by Artur Beckwith RN  Outcome: Progressing     Problem: RISK FOR INFECTION - ADULT  Goal: Absence of fever/infection during anticipated neutropenic period  Description: INTERVENTIONS  - Monitor WBC  - Administer growth factors as ordered  - Implement neutropenic guidelines  6/13/2025 1334 by Artur Beckwith RN  Outcome: Adequate for Discharge  6/13/2025 1333 by Artur Beckwith RN  Outcome: Progressing     Problem: SAFETY ADULT - FALL  Goal: Free from fall  injury  Description: INTERVENTIONS:  - Assess pt frequently for physical needs  - Identify cognitive and physical deficits and behaviors that affect risk of falls.  - [place] fall precautions as indicated by assessment.  - Educate pt/family on patient safety including physical limitations  - Instruct pt to call for assistance with activity based on assessment  - Modify environment to reduce risk of injury  - Provide assistive devices as appropriate  - Consider OT/PT consult to assist with strengthening/mobility  - Encourage toileting schedule  6/13/2025 1334 by Artur Beckwith RN  Outcome: Adequate for Discharge  6/13/2025 1333 by Artur Beckwith RN  Outcome: Progressing     Problem: DISCHARGE PLANNING  Goal: Discharge to home or other facility with appropriate resources  Description: INTERVENTIONS:  - Identify barriers to discharge w/pt and caregiver  - Include patient/family/discharge partner in discharge planning  - Arrange for needed discharge resources and transportation as appropriate  - Identify discharge learning needs (meds, wound care, etc)  - Arrange for interpreters to assist at discharge as needed  - Consider post-discharge preferences of patient/family/discharge partner  - Complete POLST form as appropriate  - Assess patient's ability to be responsible for managing their own health  - Refer to Case Management Department for coordinating discharge planning if the patient needs post-hospital services based on physician/LIP order or complex needs related to functional status, cognitive ability or social support system  6/13/2025 1334 by Artur Beckwith RN  Outcome: Adequate for Discharge  6/13/2025 1333 by Artur Beckwith RN  Outcome: Progressing     Problem: Altered Communication/Language Barrier  Goal: Patient/Family is able to understand and participate in their care  Description: Interventions:  - Assess communication ability and preferred communication style  - Implement communication aides and strategies  -  Use visual cues when possible  - Listen attentively, be patient, do not interrupt  - Minimize distractions  - Allow time for understanding and response  - Establish method for patient to ask for assistance (call light)  - Provide an  as needed  - Communicate barriers and strategies to overcome with those who interact with patient  6/13/2025 1334 by Artur Beckwith, RN  Outcome: Adequate for Discharge  6/13/2025 1333 by Artur Beckwith, RN  Outcome: Progressing

## 2025-06-13 NOTE — PLAN OF CARE
Lap anh today. 4 laps dermabonded. Came back to the floor very drowsy and applied 2L NC.  Pain managed with PRN toradol and ice pack. IVF to L ac PIV. Voiding freely. Up stby and walker.  and baby at bedside.  Problem: Patient Centered Care  Goal: Patient preferences are identified and integrated in the patient's plan of care  Description: Interventions:  - What would you like us to know as we care for you? My daughter is 7 months  - Provide timely, complete, and accurate information to patient/family  - Incorporate patient and family knowledge, values, beliefs, and cultural backgrounds into the planning and delivery of care  - Encourage patient/family to participate in care and decision-making at the level they choose  - Honor patient and family perspectives and choices  Outcome: Progressing

## 2025-06-13 NOTE — DISCHARGE SUMMARY
General Medicine Discharge Summary     Patient ID:  Shae Walls  29 year old  8/15/1995    Admit date: 6/11/2025    Discharge date and time: 6/13/25    Attending Physician: Pete Marion MD     Consults: IP CONSULT TO GASTROENTEROLOGY  IP CONSULT TO GENERAL SURGERY    Primary Care Physician: None Pcp     Reason for admission:   Cholelithiasis  Choledocholithiasis    Risk For Readmission: low    Discharge Diagnoses: Epigastric pain [R10.13]  Calculus of gallbladder with biliary obstruction but without cholecystitis [K80.21]  See Additional Discharge Diagnoses in Hospital Course    Discharged Condition: stable    Follow-up with labs/images appointments: PCP, General Surgery     Exam  Gen: No acute distress  Pulm: Lungs clear, normal respiratory effort  CV: Heart with regular rate and rhythm  Abd: Abdomen soft, surgical incision sites clean    HPI: Patient is a 29 year old female with no sig PMH sig presents with abdominal pain.  Pain started about 10 days ago focused in the epigastric and right upper quadrant area.  Patient did have some nausea and vomiting yesterday but denies any diarrhea.  Patient has not had symptoms like this in the past.  She denies any chest pain or shortness of breath with no fevers or chills.   and 7-month-old infant at bedside.     In the ER patient is afebrile, normotensive on room air.  WBC 14.2, hemoglobin 13  ,  5, total bili 2.2, lipase 696  Abdominal ultrasound: Cholelithiasis and sludge within an otherwise normal-appearing gallbladder.  There is no sonographic evidence of acute cholecystitis.  Normal sonographic appearance of the liver, right kidney, and pancreas     Hospital Course:   Patient is a 29 year old female with no sig PMH sig presents with abdominal pain.  Admitted for cholelithiasis and choledocholithiasis.  Also gallstone pancreatitis seen with elevated lipase level.  Seen by GI and surgery.  Status post lap cholecystectomy.  Completed  antibiotics and tolerated diet and pain control.  Plans to follow-up with PCP and general surgery as outpatient.     Cholelithiasis  Choledocholithiasis  Transaminitis  Hyperbilirubinemia  Elevated lipase  Leukocytosis   - Patient presents with epigastric and right upper quadrant pain with nausea  - afebrile, normotensive on room air  - WBC 14.2, hemoglobin 13  - ,  5, total bili 2.2, lipase 696  - Abdominal ultrasound: Cholelithiasis and sludge within an otherwise normal-appearing gallbladder.  There is no sonographic evidence of acute cholecystitis.  Normal sonographic appearance of the liver, right kidney, and pancreas   - MRCP with small CBD stone with no dilation   - IVF, pain control and antiemetics, PPI  - Ceftriaxone and Flagyl complete  - GI and general surgery on consult  - S/p cholecystectomy      GERD  - PPI    Operative Procedures: Procedure(s) (LRB):  LAPAROSCOPIC CHOLECYSTECTOMY WITH INTRA-OPERATIVE CHOLANGIOGRAM (N/A)     Imaging: XR OPER CHOLANGIOGRAM (CPT=74300)  Result Date: 6/12/2025   Fluoroscopy support was provided.  Images demonstrate intraoperative cholangiogram.  No filling defects are seen.. Please see separate report for additional details.     Dictated by (CST): Nathan Carrera MD on 6/12/2025 at 3:49 PM     Finalized by (CST): Nathan Carrera MD on 6/12/2025 at 3:51 PM          MRI ABDOMEN AND MRCP W/3D (DPN=[id number])  Result Date: 6/11/2025  CONCLUSION:  1. Tiny distal common bile duct stones .  No dilatation of biliary tree. 2. Cholelithiasis with cholecystitis. 3. Mild interstitial pancreatitis in pancreatic head and uncinate with small amount of surrounding fluid. 4. Evaluation slightly degraded by patient related motion artifact.    Dictated by (CST): Christoph Silva MD on 6/11/2025 at 5:47 PM     Finalized by (CST): Christoph Silva MD on 6/11/2025 at 5:56 PM          US GALLBLADDER (CPT=76705)  Result Date: 6/11/2025  CONCLUSION:   1. Cholelithiasis and sludge within an  otherwise normal-appearing gallbladder.  There is no sonographic evidence of acute cholecystitis.  2. Normal sonographic appearance of the liver, right kidney, and pancreas.    Dictated by (CST): Ismael Edmonds MD on 6/11/2025 at 11:13 AM     Finalized by (CST): Ismael Edmonds MD on 6/11/2025 at 11:17 AM          Disposition: home    Activity: as tolerated   Diet: general   Wound Care: no needs  Code Status: Full Code  O2: no needs    Home Medication Changes: as below   All discharge medications have been reconciled with current medication list.   > 30 minutes spent on dc     Med list     Medication List        START taking these medications      acetaminophen 500 MG Tabs  Commonly known as: Tylenol Extra Strength  Take 2 tablets (1,000 mg total) by mouth every 4 (four) hours as needed for Pain.     oxyCODONE-acetaminophen 5-325 MG Tabs  Commonly known as: Percocet  Take 1-2 tablets by mouth every 6 (six) hours as needed for Pain (Pump for 24 hours and discard breastmilk before resuming breastfeeding.).            CONTINUE taking these medications      famotidine 40 MG Tabs  Commonly known as: Pepcid     folic acid 1 MG Tabs  Commonly known as: Folvite     Gas-X Extra Strength 125 MG Chew  Generic drug: simethicone     Prenatal 27-1 MG Tabs     RABEprazole Sodium 20 MG Tbec  Commonly known as: ACIPHEX               Where to Get Your Medications        These medications were sent to [id number] - [place], [place] - [address] AT [facility], [phone], [phone]  [address], [facility] [address]      Hours: 24-hours Phone: [phone]   acetaminophen 500 MG Tabs  oxyCODONE-acetaminophen 5-325 MG Tabs         FU   Follow-up Information       [facility]. Schedule an appointment as soon as possible for a visit in 2 week(s).    Why: For post-operative wound recheck  Contact information:  [id number]  [facility] [address] [phone]                Matteo Villareal MD Follow up in 1 week(s).    Specialty: Internal Medicine  Contact information:  [address] [address] [phone]                             DC instructions:      Other Discharge Instructions:         POST-OPERATIVE INSTRUCTIONS LAPAROSCOPIC/ROBOTIC SURGERY    Thank you very much for allowing me to participate in your care, it is truly a privilege. Below please see discharge orders that will help you during your recovery. Please do not hesitate to call the office at [phone] for any questions. After hours, the same number will allow you to reach the on-call surgeon.     Call the office [phone] to schedule your 2 week post-operative appointment with Dr. Terry or her Physician Assistant (PA).    Change bandages daily or more frequently if needed.  Keep incisions clean with soap/ water daily.   Cover incision(s) as needed.  If you have skin glue this will come off on its own    May place an ice pack over your incisions on and off (15min) at a time for the next 24-48 hours.    Resume regular diet as tolerated (recommend starting with liquids)    General guidelines for activity:      Avoid strenuous activity or lifting anything heavier than 15 pounds.    It is OK to be up and walking around. Going up and down stairs is also OK.    Do what is comfortable: stop and rest when you feel tired.    It is OK to shower after 24 hours    You will have pain medicine ordered. Take as directed/needed.    Some discomfort, mild bruising, and swelling are not unusual; please call my office if you have any severe pain, bleeding, or high fever (over 101°F)    During the robotic/laparoscopic procedure that you had, gas is pumped into the abdominal cavity.  You may feel abdominal, shoulder, or rib pain for a few days due to this.    Resume home medications (see medication reconciliation sheet)       Do NOT drive for one day and while taking your narcotic pain medicine.        Watch  for signs of infection:    Excessive warmth or bright redness around your incisions    Leakage of bloody or cloudy fluid from you incisions    Fever over 100.5    If you experience constipation  Increase your water intake.  Increase your activity; walking is best.  An over the counter stool softener or mild laxative may be necessary if you still have not had a bowel movement after several days.       Please call the office at [phone] for any questions and to make your post op appointment if needed. Thank you again for allowing me to participate in your care, and get well soon!      Meghan Terry MD  [facility] - General Surgery   [address] [place] [address]  p [phone]           Patient had opportunity to ask questions and state understand and agree with therapeutic plan as outlined    Thank You,    Pete Marion M.D.  [facility]

## 2025-06-13 NOTE — PAYOR COMM NOTE
--------------  ADMISSION REVIEW     Payor: KILOSMITA BYRNE  Subscriber #:  [id number]  Authorization Number: [id number]    Admit date: 6/11/25  Admit time: 12:32 PM       REVIEW DOCUMENTATION:      Patient Seen in: [facility] Emergency Department    History  Chief Complaint   Patient presents with    Abnormal Liver Enzymes     Stated Complaint: Abnormal Labs    Subjective:   HPI            29-year-old female presents with abdominal pain, abnormal labs.  Recently moved here from [facility], states epigastric abdominal pain with nausea vomiting for the last week.  Was seen by GI yesterday, told to come here due to elevated liver enzymes.      Objective:   Physical Exam    ED Triage Vitals   BP 06/11/25 0943 116/75   Pulse 06/11/25 0943 61   Resp 06/11/25 0943 20   Temp 06/11/25 0943 98.3 °F (36.8 °C)   Temp src 06/11/25 0943 Oral   SpO2 06/11/25 0943 100 %   O2 Device 06/11/25 1015 None (Room air)       Current Vitals:   Vital Signs  BP: 101/64  Pulse: 58  Resp: 18  Temp: 98.6 °F (37 °C)  Temp src: Oral  MAP (mmHg): 74    Oxygen Therapy  SpO2: 100 %  O2 Device: None (Room air)  Pulse Oximetry Type: Intermittent      Physical Exam  Vital signs reviewed. Nursing note reviewed.  Constitutional: Well-developed. Well-nourished. In no acute distress  HENT: Mucous membranes moist.   EYES: No scleral icterus or conjunctival injection.  NECK: Full ROM. Supple.   CARDIAC: Normal rate.   PULM/CHEST: Non labored breathing  ABD: Non distended  RECTAL: deferred  Extremities: No deformities  NEURO: Awake, alert, following commands, moving extremities, answering questions.   SKIN: Warm and dry. No rash or lesions.  PSYCH: Normal judgment. Normal affect.      ED Course  Labs Reviewed   COMP METABOLIC PANEL (14) - Abnormal; Notable for the following components:       Result Value    Potassium 3.4 (*)     BUN 7 (*)     BUN/CREA Ratio 8.0 (*)      (*)      (*)     Alkaline Phosphatase 796 (*)     Bilirubin, Total 2.2 (*)      Albumin 5.2 (*)     All other components within normal limits   CBC WITH DIFFERENTIAL WITH PLATELET - Abnormal; Notable for the following components:    WBC 14.2 (*)     MCV 79.5 (*)     MCH 25.3 (*)     RDW 15.1 (*)     Neutrophil Absolute Prelim 9.15 (*)     Neutrophil Absolute 9.15 (*)     All other components within normal limits   LIPASE - Abnormal; Notable for the following components:    Lipase 696 (*)     All other components within normal limits   HEPATITIS A B + C PROFILE - Abnormal; Notable for the following components:    Hepatitis B Surface Antibody Nonreactive (*)     All other components within normal limits   POCT PREGNANCY URINE - Normal   RAINBOW DRAW LAVENDER   RAINBOW DRAW LIGHT GREEN   MDM     Assessment:Patient is a 29 year old female presenting to the ED due to abdominal pain, elevated LFTs.    Comorbidities/chronic illnesses impacting care: none    History obtained from: patient    External records and previous hospitalization records reviewed and documented below    Consideration of Social Determinants of Health and Impact on Medical Decision Making:  Housing/Transportation/Financial Strain/Access to healthcare/Food insecurity/family or Community support/Language and Literacy/Substance abuse/Mental health concerns/Disabilities     -recently moved here from [place]    Radiography/Imaging:  US GALLBLADDER (CPT=76705)   Final Result   PROCEDURE: US GALLBLADDER (CPT=76705)       COMPARISON: None.       INDICATIONS: Abnormal Labs       TECHNIQUE:   The gallbladder was evaluated with grayscale ultrasound.         FINDINGS:    GALLBLADDER:   There are sludge and stones within a decompressed    gallbladder.  There is no gallbladder wall thickening, no pericholecystic    fluid, and no common bile duct dilatation.   BILE DUCTS:   Normal.  Common bile duct measures 4 mm.     OTHER:   The visualized aspects of the liver, right kidney, and pancreas    appear grossly normal.                   =====    CONCLUSION:        1. Cholelithiasis and sludge within an otherwise normal-appearing    gallbladder.  There is no sonographic evidence of acute cholecystitis.       2. Normal sonographic appearance of the liver, right kidney, and pancreas.               Dictated by (CST): Ismael Edmonds MD on 6/11/2025 at 11:13 AM        Finalized by (CST): Ismael Edmonds MD on 6/11/2025 at 11:17 AM               MRI ABDOMEN AND MRCP W/3D (CPT=74181/28504)    (Results Pending)           ED course  Patient arrives here vital signs normal.  She appears nontoxic.  Reviewed her chart with elevated liver enzymes yesterday, concern for cholecystitis, choledocholithiasis, gallstone pancreatitis, possibly hepatitis.  Denies using Tylenol, low suspicion for overdose.  Will check repeat labs, gallbladder ultrasound     Laboratory results above were independently viewed and interpreted as: elevated LFTs, elevated bilirubin, elevated lipase  Radiology: ordered and independently interpreted as: Ultrasound gallbladder shows cholelithiasis    Per radiology, no signs of cholecystitis.  Possible biliary obstruction.  Consult to GI, general surgery, will plan for admission            Medications   lactated ringers infusion ( Intravenous New Bag 6/11/25 1317)   acetaminophen (Tylenol) tab 650 mg (has no administration in time range)     Or   HYDROcodone-acetaminophen (Norco) 5-325 MG per tab 1 tablet (has no administration in time range)     Or   HYDROcodone-acetaminophen (Norco) 5-325 MG per tab 2 tablet (has no administration in time range)   morphINE PF 2 MG/ML injection 1 mg (has no administration in time range)     Or   morphINE PF 2 MG/ML injection 2 mg (has no administration in time range)     Or   morphINE PF 4 MG/ML injection 4 mg (has no administration in time range)   polyethylene glycol (PEG 3350) (Miralax) 17 g oral packet 17 g (has no administration in time range)   sennosides (Senokot) tab 17.2 mg (has no administration in time  range)   bisacodyl (Dulcolax) 10 MG rectal suppository 10 mg (has no administration in time range)   fleet enema (Fleet) rectal enema 133 mL (has no administration in time range)   ondansetron (Zofran) 4 MG/2ML injection 4 mg (has no administration in time range)   prochlorperazine (Compazine) 10 MG/2ML injection 5 mg (has no administration in time range)   pantoprazole (Protonix) 40 mg in sodium chloride 0.9% PF 10 mL IV push (40 mg Intravenous Given 6/11/25 1317)   potassium chloride 40 mEq in 250mL sodium chloride 0.9% IVPB premix (40 mEq Intravenous New Bag 6/11/25 1500)   cefTRIAXone (Rocephin) 1 g in sodium chloride 0.9% 100 mL IVPB-ADDV (1 g Intravenous New Bag 6/11/25 1317)   metroNIDAZOLE in sodium chloride 0.79% (Flagyl) 5 mg/mL IVPB premix 500 mg (500 mg Intravenous New Bag 6/11/25 1317)       Admission disposition: 6/11/2025 11:55 AM    Medical Decision Making      Disposition and Plan     Clinical Impression:  1. Epigastric pain    2. Calculus of gallbladder with biliary obstruction but without cholecystitis         Disposition:  Admit  6/11/2025 11:55 am  Supplementary Documentation:         Hospital Problems       Present on Admission  Date Reviewed: 6/10/2025          ICD-10-CM Noted POA    * (Principal) Epigastric pain R10.13 6/11/2025 Unknown    Calculus of gallbladder with biliary obstruction but without cholecystitis K80.21 6/11/2025 Unknown       Signed by Geovanny Govea MD on 6/11/2025  3:46 PM       H&P    ASSESSMENT / PLAN:   Patient is a 29 year old female with no sig PMH sig presents with abdominal pain.  Admitted for cholelithiasis and likely choledocholithiasis.    Cholelithiasis  Likely choledocholithiasis  Transaminitis  Hyperbilirubinemia  Elevated lipase  Leukocytosis   - Patient presents with epigastric and right upper quadrant pain with nausea  - afebrile, normotensive on room air  - WBC 14.2, hemoglobin 13  - ,  5, total bili 2.2, lipase 696  - Abdominal ultrasound:  Cholelithiasis and sludge within an otherwise normal-appearing gallbladder.  There is no sonographic evidence of acute cholecystitis.  Normal sonographic appearance of the liver, right kidney, and pancreas   - MRCP  - IVF, pain control and antiemetics, PPI  - Ceftriaxone and Flagyl  - GI and general surgery on consult    GERD  - PPI    FN:  - IVF: LR  - Diet: NPO    DVT Prophy: SCDs  Atrophy: Ambulate   Lines: Piv    Dispo: pending clinical course    Outpatient records or previous hospital records reviewed.     Further recommendations pending patient's clinical course.  [facility] hospitalist to continue to follow patient while in house    Patient and/or patient's family given opportunity to ask questions and note understanding and agreeing with therapeutic plan as outlined    Thank You,  Pete Marion MD    [facility] Hospitalist  Answering Service number: [phone]      Electronically signed by Pete Marion MD on 6/11/2025  6:29 PM       CONSULTS    Assessment & Plan   Delroy Walls is a 29 year old female w/ no PMHx of who presented to the ED after being advised by GI with OP labs revealing elevated LFTs.     #RUQ ABD Pain  #Elevated LFTs  -Post-prandial RUQ pain with radiation to her back x1 week, associated with nausea/vomiting.  Minimal improvement with OTC antacids/antigas medications.  -Labs reveal elevated LFTs, , , , T. Bili 2.8, remains stably elevated today.  Leukocytosis to 14, Lipase 696.   -US gallbladder with cholelithiasis and sludge within otherwise normal-appearing gallbladder.  Normal liver and pancreas.  -No prior EGD/Colonoscopy   -Etiology possible choledocholithiasis v gallstone pancreatitis v passed sludge stone.  Hepatitis panel negative for acute viral infection.  -Recommend MRI/MRCP to further evaluate.  NPO for imaging, otherwise fine for clear liquids.  Consult to surgery for possible CCY.  Empiric PPI.     Recommend:  -MRI/MRCP  -NPO for imaging,  otherwise fine for clears from GI stance  -Empiric PPI  -Consult to surgery for possible CCY evaluation  -IVF, pain regimen and antiemetics per primary  -Trend LFTs daily while admitted     Thank you for the opportunity to participate in the care of this patient.     Case discussed with Ashleigh Carty MD and Alex GIBBONS.     Susy Stevens DNP, FNP-[facility]  6/11/2025           Attestation signed by Ashleigh Carty MD at 6/11/2025  3:28 PM     Attending Attestation  I have personally seen and examined the patient independently, reviewed the APPs history, exam. Notes, labs, imaging, chart reviewed.  I agree with the KAREY's note above with the following additions.     29F with acute biliary colic and elevated LFTs. Gallstone pancreatitis and possible persistent choledocholithiasis. Check MRCP. No clinical signs of cholangitis, no imaging findings of cholecystitis. No need for abx from my stance unless develops fever. Surgery following for timing of cholecystectomy. Supportive care for pancreatitis including IVF, NPO, pain control and antiemetics. Recent travel to [place], hep A negative.      Is this a shared or split note between Advanced Practice Provider and Physician? Yes     100% of medical decision making was performed by myself.     Ashleigh Craty MD                     Cosigned by: Ashleigh Carty MD at 6/11/2025  3:28 PM          Medical Decision Making          Impression:  [Problem List]    [Problem List]  Patient Active Problem List      Diagnosis    Epigastric pain    Calculus of gallbladder with biliary obstruction but without cholecystitis        Cholelithiasis, suspected choledocholithiasis     Plan:  Patient scheduled for MRCP today to rule out choledocholithiasis  GI consulted, appreciate recommendations  Plan for cholecystectomy during this admission, timing TBD pending MRCP results  Okay for clear liquids from surgical standpoint if no plans for ERCP today  Continue IV abx  Continue  analgesics and antiemetics as needed  Dr. Terry to evaluate patient and provide further surgical recommendations     Nat Bhatia PA-C  2025  12:32 PM     OP REPORT       Date of Service: 2025  2:16 PM    OPERATIVE NOTE     PATIENT NAME: Shae Walls                         :  8/15/1995                           MRN:  [id number]  ATTENDING PHYSICIAN:  Pete Marion MD                  PROCEDURE DATE:  2025         PREOPERATIVE DIAGNOSIS:   Choledocholithiasis  Pancreatitis      POSTOPERATIVE DIAGNOSIS: Same, plus acute cholecystitis      SURGEON: Meghan Terry MD     ASSISTANT: Nat Chavez PA-C      OPERATION: Laparoscopic cholecystectomy with intraoperative cholangiogram      ANESTHESIA: General     ESTIMATED BLOOD LOSS:  5 ml       COMPLICATIONS: None      SPECIMENS: Was Obtained: Gallbladder     OPERATIVE FINDINGS: negative IOC          INDICATIONS: The patient is a 29 year old year old female with history of above preop diagnosis.  I explained the risk, benefits, expected outcome, and alternatives to the procedure. Patient understands the risks include but not inclusive to bleeding, infection, bile leak, bile duct injury, diarrhea, chronic pain, and persistent symptoms.  She indicates understanding and wishes to proceed with surgery.      DESCRIPTION OF PROCEDURE:    The patient was brought to the operating room and placed in supine position.  After initiation of general anesthesia by the Anesthesia Department, the abdomen was prepped and draped normal sterile fashion.  A Veress needle was placed in the left upper quadrant and the abdomen was insufflated without difficulty to a pressure of 15 mmhg.  We placed three 5 mm trocars, one in the left upper quadrant, 2 in the right upper quadrant, and 11 mm trocar in the supraumbilical position.     Upon entering the abdomen, the gallbladder was identified, grasped, and retracted cephalad.  The peritoneum overlying the junction of the cystic  duct was stripped free.  Hook electrocautery was used to detach the lateral peritoneal attachments and the left and right side of the gallbladder up towards the fundus.  We isolated the cystic duct and cystic artery and dissected the posterior aspect of the gallbladder from the gallbladder fossa.  There were only 2 structures noted, the cystic duct and the cystic artery.  The critical view of safety was obtained. A clip was placed on the specimen side of the gallbladder. A partial cystotomy was created with scissors. The cholangiogram catheter was advanced over a wire into the cystic duct. Cholangiogram with 50/50 contrast was obtained under fluoroscopy. There was no obstruction of the common bile duct and contrast was seen in the duodenum. The catheter was removed. Two additional clips were placed on the stay side of the gallbladder and the cystic duct was divided. The cystic artery was clipped and divided.       Hook electrocautery was used to remove the gallbladder from the gallbladder fossa.  Prior to amputating the gallbladder, we again re-evaluated our clip structures, there was no evidence of bleeding or bilious extravasation.  The gallbladder was amputated and removed using a wound protection device.  Multiple 0 Vicryl sutures were used to close the fascial defect at the gallbladder extraction site. Local anesthetic was injected into the TAP plane along each trocar site and the skin incision were also anesthetized. The skin was closed using 4-0 Monocryl suture.  All instrument and sponge counts were correct at the end of the case. I was present and scrubbed for the entire operation. The patient tolerated the procedure well, was extubated, and sent to the recovery room in stable condition.             Meghan Terry MD  [facility] - General Surgery   [address]  p [phone]           Revision History       PROGRESS NOTES     Date of Service: 6/12/2025 11:43  AM     Signed         [facility] Hospitalist Progress Note      CC: Hospital Follow up     PCP: None Pcp         Assessment/Plan:      Principal Problem:    Epigastric pain  Active Problems:    Calculus of gallbladder with biliary obstruction but without cholecystitis     Patient is a 29 year old female with no sig PMH sig presents with abdominal pain.  Admitted for cholelithiasis and choledocholithiasis.     Cholelithiasis  Choledocholithiasis  Transaminitis  Hyperbilirubinemia  Elevated lipase  Leukocytosis   - Patient presents with epigastric and right upper quadrant pain with nausea  - afebrile, normotensive on room air  - WBC 14.2, hemoglobin 13  - ,  5, total bili 2.2, lipase 696  - Abdominal ultrasound: Cholelithiasis and sludge within an otherwise normal-appearing gallbladder.  There is no sonographic evidence of acute cholecystitis.  Normal sonographic appearance of the liver, right kidney, and pancreas   - MRCP with small CBD stone with no dilation   - IVF, pain control and antiemetics, PPI  - Ceftriaxone and Flagyl  - GI and general surgery on consult  - plans for cholecystectomy      GERD  - PPI     FN:  - IVF: LR  - Diet: NPO     DVT Prophy: SCDs  Atrophy: Ambulate   Lines: Piv     Dispo: pending clinical course     Outpatient records or previous hospital records reviewed.      Further recommendations pending patient's clinical course.  [facility] hospitalist to continue to follow patient while in house     Patient and/or patient's family given opportunity to ask questions and note understanding and agreeing with therapeutic plan as outlined     Thank You,  Pete Marion MD     [facility] Hospitalist  Answering Service number: [phone]      Subjective:      No CP, SOB, or N/V.  Pain is much better.   and baby at bedside.      OBJECTIVE:     Blood pressure 115/68, pulse 63, temperature 97.3 °F (36.3 °C), temperature source Oral, resp. rate 18, height 5' 1\" (1.549 m), weight 121 lb  4.1 oz (55 kg), SpO2 100%, currently breastfeeding.     Temp:  [97.3 °F (36.3 °C)-98.6 °F (37 °C)] 97.3 °F (36.3 °C)  Pulse:  [51-63] 63  Resp:  [16-18] 18  BP: (101-129)/(64-88) 115/68  SpO2:  [100 %] 100 %        Intake/Output:  No intake or output data in the 24 hours ending 06/12/25 1144          Last 3 Weights   06/11/25 1236 121 lb 4.1 oz (55 kg)   06/10/25 1659 120 lb (54.4 kg)         Exam   General: Alert, no acute distress  Lungs: clear to ausculation bilaterally  Heart: Regular rate and rhythm  Abdomen: soft, non tender, non distended   Extremities: No edema  Skin: no new rash, normal color  Neuro: 5/5 strength in bilateral extremities, normal sensations  Psych: appropriate affect         Data Review:       Labs:            Recent Labs   Lab 06/10/25  1740 06/11/25  1025 06/12/25  0632   RBC 5.23 5.13 4.48   HGB 13.2 13.0 11.6*   HCT 41.8 40.8 36.6   MCV 79.9* 79.5* 81.7   MCH 25.2* 25.3* 25.9*   MCHC 31.6 31.9 31.7   RDW 15.0 15.1* 14.8   NEPRELIM 10.35* 9.15* 5.87   WBC 13.9* 14.2* 9.6   .0* 434.0 323.0                 Recent Labs   Lab 06/11/25  1025 06/12/25  0632   GLU 89 82   BUN 7* 10   CREATSERUM 0.87 0.76   EGFRCR 92 108   CA 9.7 9.1    138   K 3.4* 3.9  3.9    105   CO2 25.0 22.0               Recent Labs   Lab 06/10/25  1740 06/11/25  1025 06/12/25  0632   * 535* 378*   * 263* 140*   ALB 4.9* 5.2* 4.2            Imaging:  MRI ABDOMEN AND MRCP W/3D (VJL=59278/[id number])  Result Date: 6/11/2025  CONCLUSION:  1. Tiny distal common bile duct stones .  No dilatation of biliary tree. 2. Cholelithiasis with cholecystitis. 3. Mild interstitial pancreatitis in pancreatic head and uncinate with small amount of surrounding fluid. 4. Evaluation slightly degraded by patient related motion artifact.    Dictated by (CST): Christoph Silva MD on 6/11/2025 at 5:47 PM     Finalized by (CST): Christoph Silva MD on 6/11/2025 at 5:56 PM           US GALLBLADDER (CPT=76705)  Result Date:  6/11/2025  CONCLUSION:   1. Cholelithiasis and sludge within an otherwise normal-appearing gallbladder.  There is no sonographic evidence of acute cholecystitis.  2. Normal sonographic appearance of the liver, right kidney, and pancreas.    Dictated by (CST): Ismael Edmonds MD on 6/11/2025 at 11:13 AM     Finalized by (CST): Ismael Edmonds MD on 6/11/2025 at 11:17 AM                 Meds:      [Scheduled Medications]    [Scheduled Medications]   pantoprazole  40 mg Intravenous Daily    cefTRIAXone  1 g Intravenous Q24H    metroNIDAZOLE  500 mg Intravenous Q8H     [Medication Infusions]    [Medication Infusions]   lactated ringers 150 mL/hr at 06/11/25 1527     [PRN Medications]    [PRN Medications]    acetaminophen **OR** HYDROcodone-acetaminophen **OR** HYDROcodone-acetaminophen    morphINE **OR** morphINE **OR** morphINE    polyethylene glycol (PEG 3350)    sennosides    bisacodyl    fleet enema    ondansetron    prochlorperazine                       MEDS  Scheduled Meds Sorted by Name     Medications 06/11/25 06/12/25 06/13/25    acetaminophen (Tylenol Extra Strength) tab 1,000 mg  Dose: 1,000 mg  Freq: Every 8 hours Route: OR  Start: 06/12/25 1630   Admin Instructions:   Use PRN reason as a guide and follow range order policy     (1630 AJ)-Not Given        (0030 MW)-Not Given   0841 KD-Given   1630        cefTRIAXone (Rocephin) 1 g in sodium chloride 0.9% 100 mL IVPB-ADDV  Dose: 1 g  Freq: Every 24 hours Route: IV  Last Dose: 1 g (06/12/25 1209)  Start: 06/11/25 1300 End: 06/12/25 1623   Admin Instructions:   Ceftriaxone must NOT be administered simultaneously with calcium containing IV solutions. Includes Y-site as well.  In patients other than neonates ceftriaxone and calcium containing products may administered sequentially, provided the line is flushed in between administrations.   Order specific questions:       1317 AN-New Bag        1209 AJ-New Bag   1623-D/C'd        metroNIDAZOLE in sodium chloride  0.79% (Flagyl) 5 mg/mL IVPB premix 500 mg  Dose: 500 mg  Freq: Every 8 hours Route: IV  Last Dose: 500 mg (06/12/25 0515)  Start: 06/11/25 1300 End: 06/12/25 1623   Order specific questions:       1317 AN-New Bag   2043 MW-New Bag       0515 MW-New Bag   (1300 AJ)-Not Given   1404 HL-Given     1623-D/C'd         pantoprazole (Protonix) 40 mg in sodium chloride 0.9% PF 10 mL IV push  Dose: 40 mg  Freq: Daily Route: IV  Start: 06/11/25 1245   Admin Instructions:   Dilute with 10 ml NS; IV push over 2 minutes    1317 AN-Given        1007 AJ-Given   1251 UE-MAR Hold   1623 MK-MAR Unhold      0841 KD-Given          potassium chloride 40 mEq in 250mL sodium chloride 0.9% IVPB premix  Dose: 40 mEq  Freq: Once Route: IV  Last Dose: 40 mEq (06/11/25 1500)  Start: 06/11/25 1300 End: 06/11/25 1900    1500 AN-New Bag              Continuous Meds Sorted by Name     Medications 06/11/25 06/12/25 06/13/25   lactated ringers infusion  Rate: 100 mL/hr  Freq: Continuous Route: IV  Last Dose: Stopped (06/13/25 1256)  Start: 06/12/25 1515 End: 06/12/25 1622     (1515 AJ)-Not Given   1622-D/C'd     1256 KD-Stopped          lactated ringers infusion  Rate: 150 mL/hr  Freq: Continuous Route: IV  Last Dose: Stopped (06/13/25 1256)  Start: 06/11/25 1245    1317 AN-New Bag   1527 AN-Rate/Dose Change       1348 HL-Paused [C]   1349 HL-New Bag   1425 HL-Anesthesia Volume Adjustment      1256 KD-Stopped            PRN Meds Sorted by Name     Medications 06/11/25 06/12/25 06/13/25                                    bupivacaine PF (Marcaine) 0.5% injection  Freq: As needed  Start: 06/12/25 1511 End: 06/12/25 1522     1511 ZK-Given   1522-D/C'd                                                  iopamidol (ISOVUE-M 300) 61 % injection  Freq: As needed  Start: 06/12/25 1448 End: 06/12/25 1522     1448 LK-Given [C]   1522-D/C'd        ketorolac (Toradol) 15 MG/ML injection 15 mg  Dose: 15 mg  Freq: Every 6 hours PRN Route: IV  PRN Reason: mild  pain  Start: 06/12/25 1649 End: 06/14/25 1648     1658 AJ-Given                                                                    oxyCODONE immediate release tab 5 mg  Dose: 5 mg  Freq: Every 4 hours PRN Route: OR  PRN Reason: moderate pain  Start: 06/12/25 1623 2014 MW-Given          0729 KD-Given        Vitals (since admission)    Date/Time Temp Pulse Resp BP SpO2 Weight O2 Device O2 Flow Rate (L/min) [facility]   06/13/25 1055 98.8 °F (37.1 °C) 56 18 109/75 96 % -- None (Room air) -- LM   06/13/25 0537 98.8 °F (37.1 °C) 60 16 118/75 99 % -- None (Room air) -- BS   06/12/25 1944 98.2 °F (36.8 °C) 53 18 133/78 100 % -- None (Room air) --    06/12/25 1622 -- 71 16 149/92 Abnormal  97 % -- Nasal cannula 2 L/min MK   06/12/25 1610 -- 81 18 -- 94 % -- -- --    06/12/25 1600 97.4 °F (36.3 °C) 77 20 146/93 Abnormal  97 % -- None (Room air) --    06/12/25 1550 -- 70 22 135/103 Abnormal  91 % -- None (Room air) -- ES   06/12/25 1540 -- 88 18 151/98 Abnormal  98 % -- None (Room air) --    06/12/25 1530 -- 84 21 145/98 Abnormal  100 % -- None (Room air) --    06/12/25 1522 97.8 °F (36.6 °C) 86 23 133/95 Abnormal  100 % -- Simple mask 5 L/min YO   06/12/25 1333 -- 68 18 -- 100 % -- None (Room air) -- AA   06/12/25 0248 97.3 °F (36.3 °C) 63 18 115/68 100 % -- None (Room air) -- BS   06/11/25 1930 97.6 °F (36.4 °C) 51 16 121/84 100 % -- None (Room air) --    06/11/25 1857 98.2 °F (36.8 °C) 58 18 129/88 100 % -- None (Room air) --    06/11/25 1506 98.6 °F (37 °C) 58 18 101/64 100 % -- None (Room air) --    06/11/25 1236 98.2 °F (36.8 °C) 54 18 107/74 100 % 121 lb 4.1 oz (55 kg) None (Room air) --    06/11/25 1215 -- 53 18 115/68 100 % -- None (Room air) --    06/11/25 1130 -- 52 16 117/69 100 % -- None (Room air) --    06/11/25 1100 -- 49 Abnormal  15 107/73 100 % -- None (Room air) --    06/11/25 1015 -- 58 16 106/64 100 % -- None (Room air) --    06/11/25 0943 98.3 °F (36.8 °C) 61 20 116/75 100 % -- --  -- BW      No Repair - Repaired With Adjacent Surgical Defect Text (Leave Blank If You Do Not Want): After obtaining clear surgical margins the defect was repaired concurrently with another surgical defect which was in close approximation.

## 2025-06-13 NOTE — PLAN OF CARE
No acute changes over night. Pt is alert and oriented on RA. Pt is voiding freely. Pt is tolerating a General diet. Pt denies nausea or vomiting. Pain managed with Oxycodone and Tylenol.  Pt is ambulating with a standby assist. Call light is within reach and safety measures are in place.     Problem: GASTROINTESTINAL - ADULT  Goal: Minimal or absence of nausea and vomiting  Description: INTERVENTIONS:  - Maintain adequate hydration with IV or PO as ordered and tolerated  - Nasogastric tube to low intermittent suction as ordered  - Evaluate effectiveness of ordered antiemetic medications  - Provide nonpharmacologic comfort measures as appropriate  - Advance diet as tolerated, if ordered  - Obtain nutritional consult as needed  - Evaluate fluid balance  Outcome: Progressing  Goal: Maintains or returns to baseline bowel function  Description: INTERVENTIONS:  - Assess bowel function  - Maintain adequate hydration with IV or PO as ordered and tolerated  - Evaluate effectiveness of GI medications  - Encourage mobilization and activity  - Obtain nutritional consult as needed  - Establish a toileting routine/schedule  - Consider collaborating with pharmacy to review patient's medication profile  Outcome: Progressing     Problem: PAIN - ADULT  Goal: Verbalizes/displays adequate comfort level or patient's stated pain goal  Description: INTERVENTIONS:  - Encourage pt to monitor pain and request assistance  - Assess pain using appropriate pain scale  - Administer analgesics based on type and severity of pain and evaluate response  - Implement non-pharmacological measures as appropriate and evaluate response  - Consider cultural and social influences on pain and pain management  - Manage/alleviate anxiety  - Utilize distraction and/or relaxation techniques  - Monitor for opioid side effects  - Notify MD/LIP if interventions unsuccessful or patient reports new pain  - Anticipate increased pain with activity and pre-medicate as  appropriate  Outcome: Progressing     Problem: RISK FOR INFECTION - ADULT  Goal: Absence of fever/infection during anticipated neutropenic period  Description: INTERVENTIONS  - Monitor WBC  - Administer growth factors as ordered  - Implement neutropenic guidelines  Outcome: Progressing     Problem: SAFETY ADULT - FALL  Goal: Free from fall injury  Description: INTERVENTIONS:  - Assess pt frequently for physical needs  - Identify cognitive and physical deficits and behaviors that affect risk of falls.  - [place] fall precautions as indicated by assessment.  - Educate pt/family on patient safety including physical limitations  - Instruct pt to call for assistance with activity based on assessment  - Modify environment to reduce risk of injury  - Provide assistive devices as appropriate  - Consider OT/PT consult to assist with strengthening/mobility  - Encourage toileting schedule  Outcome: Progressing     Problem: DISCHARGE PLANNING  Goal: Discharge to home or other facility with appropriate resources  Description: INTERVENTIONS:  - Identify barriers to discharge w/pt and caregiver  - Include patient/family/discharge partner in discharge planning  - Arrange for needed discharge resources and transportation as appropriate  - Identify discharge learning needs (meds, wound care, etc)  - Arrange for interpreters to assist at discharge as needed  - Consider post-discharge preferences of patient/family/discharge partner  - Complete POLST form as appropriate  - Assess patient's ability to be responsible for managing their own health  - Refer to Case Management Department for coordinating discharge planning if the patient needs post-hospital services based on physician/LIP order or complex needs related to functional status, cognitive ability or social support system  Outcome: Progressing     Problem: Altered Communication/Language Barrier  Goal: Patient/Family is able to understand and participate in their  care  Description: Interventions:  - Assess communication ability and preferred communication style  - Implement communication aides and strategies  - Use visual cues when possible  - Listen attentively, be patient, do not interrupt  - Minimize distractions  - Allow time for understanding and response  - Establish method for patient to ask for assistance (call light)  - Provide an  as needed  - Communicate barriers and strategies to overcome with those who interact with patient  Outcome: Progressing

## 2025-06-13 NOTE — PROGRESS NOTES
[facility]  Progress Note    Shae Walls Patient Status:  Inpatient    8/15/1995 MRN [id number]   Location [facility] 4W/SW/SE Attending Pete Marion MD   Hosp Day # 2 PCP None Pcp     Subjective:  Pt seen laying in bed. Feeling well, expected postop pain. Tolerating diet without nausea or vomiting. No fever or chills.    Objective/Physical Exam:  General: Alert, orientated x3.  Cooperative.  No apparent distress.  Vital Signs:  Blood pressure 118/75, pulse 60, temperature 98.8 °F (37.1 °C), temperature source Oral, resp. rate 16, height 5' 1\" (1.549 m), weight 121 lb 4.1 oz (55 kg), SpO2 99%, currently breastfeeding.  Wt Readings from Last 3 Encounters:   25 121 lb 4.1 oz (55 kg)   06/10/25 120 lb (54.4 kg)     Lungs: No respiratory distress.  Cardiac: Regular rate and rhythm.   Abdomen:  Soft, non distended, expected incisional tender, with no rebound or guarding.  No peritoneal signs.   Extremities:  No lower extremity edema noted.    Incision: Clean, dry, intact, no erythema    Intake/Output:    Intake/Output Summary (Last 24 hours) at 2025 0956  Last data filed at 2025 1425  Gross per 24 hour   Intake 700 ml   Output --   Net 700 ml     I/O last 3 completed shifts:  In: 700 [I.V.:700]  Out: -   No intake/output data recorded.    Medications: Scheduled Medications[1]    Labs:  Lab Results   Component Value Date    WBC 14.4 2025    HGB 11.7 2025    HCT 35.5 2025    .0 2025     Lab Results   Component Value Date     2025    K 3.6 2025     2025    CO2 26.0 2025    BUN 7 2025    CREATSERUM 0.70 2025     2025    CA 9.0 2025    ALKPHO 496 2025     2025    AST 98 2025    BILT 1.1 2025    ALB 4.2 2025    TP 6.4 2025     No results found for: \"PT\", \"INR\"      Assessment  Problem List[2]    Cholelithiasis, suspected passed common bile  duct stones  Transaminitis, downtrending     Plan:  Recovering well s/p lap anh. Stable for discharge home from surgical standpoint today  Follow up in 2 weeks for postoperative appointment, sooner as needed  Analgesics and antiemetics as needed  Ambulate and up to chair  GI prophylaxis with Protonix    Quality:  DVT Mechanical Prophylaxis:   SCDs, Early ambuation  DVT Pharmacologic Prophylaxis   Medication   None                Code Status: Full Code  Manuel: No urinary catheter in place  Manuel Duration (in days):   Central line:    HERMAN: 6/13/2025        Osman Galaviz PA-C  6/13/2025  9:56 AM               [1]    acetaminophen  1,000 mg Oral Q8H    pantoprazole  40 mg Intravenous Daily   [2]   Patient Active Problem List  Diagnosis    Epigastric pain    Calculus of gallbladder with biliary obstruction but without cholecystitis

## 2025-06-27 ENCOUNTER — OFFICE VISIT (OUTPATIENT)
Dept: SURGERY | Facility: CLINIC | Age: 30
End: 2025-06-27

## 2025-06-27 VITALS — SYSTOLIC BLOOD PRESSURE: 122 MMHG | HEART RATE: 79 BPM | DIASTOLIC BLOOD PRESSURE: 77 MMHG

## 2025-06-27 DIAGNOSIS — K81.0 ACUTE CHOLECYSTITIS: Primary | ICD-10-CM

## 2025-06-27 PROCEDURE — 99024 POSTOP FOLLOW-UP VISIT: CPT | Performed by: STUDENT IN AN ORGANIZED HEALTH CARE EDUCATION/TRAINING PROGRAM

## 2025-06-27 NOTE — PROGRESS NOTES
Follow Up Visit Note       Active Problems      1. Acute cholecystitis          Chief Complaint   Chief Complaint   Patient presents with    Post-Op     Pt has on Laparoscopic cholecystectomy with intra operative cholangiogram 25          History of Present Illness    Shae Walls is a 29 year old female who underwent laparoscopic cholecystectomy with intraoperative cholangiogram on 2025 for acute cholecystitis. She is recovering well. Has intermittent RUQ incisional pain after sleeping. No issues with appetite or activity level. No fevers/chills.     Allergies  Shae has no known allergies.    Past Medical / Surgical / Social / Family History    The past medical and past surgical history have been reviewed by me today.    Past Medical History[1]  Past Surgical History[2]    The family history and social history have been reviewed by me today.    Family History[3]  Social Hx on file[4]   Medications - Current[5]     Review of Systems  The Review of Systems has been reviewed by me during today.  Review of Systems   All other systems reviewed and are negative.       Physical Findings   /77 (BP Location: Left arm, Patient Position: Sitting, Cuff Size: adult)   Pulse 79   LMP  (LMP Unknown)   Physical Exam  Abdominal:      General: Abdomen is flat. There is no distension.      Palpations: Abdomen is soft.      Tenderness: There is no abdominal tenderness.      Comments: Incisions c/d/I           Assessment   1. Acute cholecystitis    Sp laparoscopic cholecystectomy with intraoperative cholangiogram 25    Plan   Doing well post op   No activity restrictions  Resume normal diet/activity  Follow up as needed      No orders of the defined types were placed in this encounter.      Imaging & Referrals   None    Follow Up  No follow-ups on file.    Meghan Terry MD           [1] No past medical history on file.  [2]   Past Surgical History:  Procedure Laterality Date          Laparoscopic  cholecystectomy N/A 06/12/2025    Laparoscopic cholecystectomy with intra operative cholangiogram   [3]   Family History  Problem Relation Age of Onset    Colon Cancer Neg    [4]   Social History  Socioeconomic History    Marital status:    Tobacco Use    Smoking status: Never     Passive exposure: Never    Smokeless tobacco: Never   Substance and Sexual Activity    Drug use: Never   [5]   Current Outpatient Medications:     acetaminophen 500 MG Oral Tab, Take 2 tablets (1,000 mg total) by mouth every 4 (four) hours as needed for Pain., Disp: 20 tablet, Rfl: 0    oxyCODONE-acetaminophen 5-325 MG Oral Tab, Take 1-2 tablets by mouth every 6 (six) hours as needed for Pain (Pump for 24 hours and discard breastmilk before resuming breastfeeding.)., Disp: 5 tablet, Rfl: 0    famotidine 40 MG Oral Tab, Take 1 tablet (40 mg total) by mouth at bedtime., Disp: , Rfl:     RABEprazole Sodium 20 MG Oral Tab EC, Take 1 tablet (20 mg total) by mouth daily., Disp: , Rfl:     simethicone (GAS-X EXTRA STRENGTH) 125 MG Oral Chew Tab, Chew 1 tablet (125 mg total) by mouth every 6 (six) hours as needed., Disp: , Rfl:     Prenatal 27-1 MG Oral Tab, Take 1 tablet by mouth daily., Disp: , Rfl:     folic acid 1 MG Oral Tab, Take 1 tablet (1 mg total) by mouth daily., Disp: , Rfl:

## 2025-07-14 ENCOUNTER — TELEPHONE (OUTPATIENT)
Facility: CLINIC | Age: 30
End: 2025-07-14

## 2025-07-14 NOTE — TELEPHONE ENCOUNTER
7/14/2025, 1st reminder letter mailed out to patient Via my chart    Imaging Ordered:  US GALLBLADDER (CPT=76705) (Order #[id number]) on 6/10/25

## (undated) DEVICE — SUTURE VICRYL 2-0 SH 27IN BRAID COAT ABS UNDYED J417H

## (undated) DEVICE — SUTURE VICRYL 0 CT1 36IN BRAID COAT ABS VIOL J346H

## (undated) DEVICE — PEN SURGMRK DEVON SKIN DISP NONSMEAR REG TIP FLXB RULER LBL

## (undated) DEVICE — DAVINCI XI CLIP HEMO O LOK MEDIUM-LARGE GREEN

## (undated) DEVICE — PAD DRSG 8X3IN CRD POLY CTN ABS PERFORATE FILM LF STRL

## (undated) DEVICE — ELECTRODE ESURG 360D BLADE PNCL 10FT 38IN 78IN ADPR RADOPQ

## (undated) DEVICE — STRIP PK 0.25INX5YD TIGHTLY WVN IODO CURAD

## (undated) DEVICE — GLOVE SURG 8 PROTEXIS LF CRM PF BEAD CUFF STRL PLISPRN 12IN

## (undated) DEVICE — GLOVE SURG 7.5 PROTEXIS LF CRM PF SMTH BEAD CUFF STRL

## (undated) DEVICE — TUBING MEGADYNE LAPAROSCOPIC

## (undated) DEVICE — GAMMEX® PI HYBRID SIZE 6.5, STERILE POWDER-FREE SURGICAL GLOVE, POLYISOPRENE AND NEOPRENE BLEND: Brand: GAMMEX

## (undated) DEVICE — Device

## (undated) DEVICE — INSUFFLATION NEEDLE TO ESTABLISH PNEUMOPERITONEUM.: Brand: INSUFFLATION NEEDLE

## (undated) DEVICE — EXOFIN PRECISION PEN HIGH VISCOSITY TOPICAL SKIN ADHESIVE: Brand: EXOFIN PRECISION PEN, 1G

## (undated) DEVICE — SHEET TRNSF 1960X760MM 480MM MAXITUBE FLITES PRM TUBE LF

## (undated) DEVICE — TRAY CATH CMP CR LUBRI-SIL IC STLK SURESTEP 16FR FOLEY URMTR

## (undated) DEVICE — SUTURE 2-0 CT1 27IN PLN MONO ABS YELLOWISH TAN 843H

## (undated) DEVICE — GLOVE SURG 7 PROTEXIS LF CRM PF BEAD CUFF STRL PLISPRN 12IN

## (undated) DEVICE — KIT RM TURNOVER CSTM DISP STRL LF

## (undated) DEVICE — [HIGH FLOW INSUFFLATOR,  DO NOT USE IF PACKAGE IS DAMAGED,  KEEP DRY,  KEEP AWAY FROM SUNLIGHT,  PROTECT FROM HEAT AND RADIOACTIVE SOURCES.]: Brand: PNEUMOSURE

## (undated) DEVICE — 2% CHLORHEXIDINE SKIN PREP ORANGE 26ML

## (undated) DEVICE — PAD ABD 9X5IN CELLULOSE ABS NONWOVEN HDRPHB BACK SEAL EDGE

## (undated) DEVICE — PLUMEPORT ACTIV LAPAROSCOPIC SMOKE FILTRATION DEVICE: Brand: PLUMEPORT ACTIVE

## (undated) DEVICE — SPONGE GAUZE 4X4IN CTN 12 PLY STRL CURITY

## (undated) DEVICE — SUTURE VICRYL 3-0 CT1 36IN BRAID COAT ABS VIOL J344H

## (undated) DEVICE — GOWN SURG XL REG LGTH L3 NONREINFORCE HOOK LOOP CLSR RAGLAN

## (undated) DEVICE — LAP CHOLE: Brand: MEDLINE INDUSTRIES, INC.

## (undated) DEVICE — SOLUTION IRRIG 1000ML 0.9% NACL USP BTL

## (undated) DEVICE — STRIP 4X.5IN STRSTRP IPHR POLY POR ADH REINFORCE

## (undated) DEVICE — GLOVE SURG 6 PROTEXIS LF CRM PF BEAD CUFF STRL PLISPRN 11.5

## (undated) DEVICE — OPEN-END URETERAL CATHETER SOF-FLEX: Brand: SOF-FLEX

## (undated) DEVICE — GLOVE SURG 6.5 PROTEXIS LF CRM PF BEAD CUFF STRL PLISPRN

## (undated) DEVICE — GLOVE SUR 6 SENSICARE PI MIC PIP CRM PWD F

## (undated) DEVICE — SOLUTION IRRIG 3000ML 0.9% NACL FLX CONT

## (undated) DEVICE — EVACUATOR SUR 100CC SIL BLB WND

## (undated) DEVICE — CUFF CMPR CALF VENAFLOW ELITE SGL HOSE REPEOCESSED

## (undated) DEVICE — SUTURE MONOCRYL MTPS 4-0 PS2 18IN MONO ABS UNDYED

## (undated) DEVICE — GLOVE SUR 6.5 SENSICARE PI MIC PIP CRM PWD F

## (undated) DEVICE — PTFE WIRE GUIDE WITH 3CM FLEXIBLE TIP: Brand: COOK

## (undated) DEVICE — TISSUE RETRIEVAL SYSTEM: Brand: INZII RETRIEVAL SYSTEM

## (undated) DEVICE — TROCAR: Brand: KII FIOS FIRST ENTRY

## (undated) DEVICE — Device: Brand: SUTURE PASSOR PRO

## (undated) DEVICE — TROCAR: Brand: KII® SLEEVE

## (undated) DEVICE — DRAIN SUR W10MMXL20CM SIL FULL PERF HUBLESS

## (undated) DEVICE — PEN SURGMRK DEVON SKIN DISP REG TIP RULER CAP GENTIAN VIOL

## (undated) DEVICE — SUT COAT VCRL + 0 54IN ABSRB UD ANTIBACT

## (undated) DEVICE — SUTURE VICRYL 0 CTX 36IN BRAID COAT ABS VIOL J370H

## (undated) DEVICE — ADHESIVE DRMBND ADV .7ML LIQUID APL MCBL BRR FLXB 2 OCTYL

## (undated) DEVICE — ELECTRODE PT RTN C30- LB 9FT CORD NONIRRITATE NONSENSITIZE

## (undated) DEVICE — SUT MCRYL 4-0 18IN PS-2 ABSRB UD 19MM 3/8 CIR

## (undated) NOTE — LETTER
[facility]  [address], [place], [place]    Authorization for Surgical Operation and Procedure                               I hereby authorize Meghan Terry MD, my physician and his/her assistants (if applicable), which may include medical students, residents, and/or fellows, to perform the following surgical operation/ procedure and administer such anesthesia as may be determined necessary by my physician: Operation/Procedure name (s) LAPAROSCOPIC CHOLECYSTECTOMY WITH INTRA-OPERATIVE CHOLANGIOGRAM on [facility]   2.   I recognize that during the surgical operation/procedure, unforeseen conditions may necessitate additional or different procedures than those listed above.  I, therefore, further authorize and request that the above-named surgeon, assistants, or designees perform such procedures as are, in their judgment, necessary and desirable.    3.   My surgeon/physician has discussed prior to my surgery the potential benefits, risks and side effects of this procedure; the likelihood of achieving goals; and potential problems that might occur during recuperation.  They also discussed reasonable alternatives to the procedure, including risks, benefits, and side effects related to the alternatives and risks related to not receiving this procedure.  I have had all my questions answered and I acknowledge that no guarantee has been made as to the result that may be obtained.    4.   Should the need arise during my operation/procedure, which includes change of level of care prior to discharge, I also consent to the administration of blood and/or blood products.  Further, I understand that despite careful testing and screening of blood or blood products by collecting agencies, I may still be subject to ill effects as a result of receiving a blood transfusion and/or blood products.  The following are some, but not all, of the potential risks that can occur: fever and allergic reactions, hemolytic  reactions, transmission of diseases such as Hepatitis, AIDS and Cytomegalovirus (CMV) and fluid overload.  In the event that I wish to have an autologous transfusion of my own blood, or a directed donor transfusion, I will discuss this with my physician.  Check only if Refusing Blood or Blood Products  I understand refusal of blood or blood products as deemed necessary by my physician may have serious consequences to my condition to include possible death. I hereby assume responsibility for my refusal and release the hospital, its personnel, and my physicians from any responsibility for the consequences of my refusal.    o  Refuse   5.   I authorize the use of any specimen, organs, tissues, body parts or foreign objects that may be removed from my body during the operation/procedure for diagnosis, research or teaching purposes and their subsequent disposal by hospital authorities.  I also authorize the release of specimen test results and/or written reports to my treating physician on the hospital medical staff or other referring or consulting physicians involved in my care, at the discretion of the Pathologist or my treating physician.    6.   I consent to the photographing or videotaping of the operations or procedures to be performed, including appropriate portions of my body for medical, scientific, or educational purposes, provided my identity is not revealed by the pictures or by descriptive texts accompanying them.  If the procedure has been photographed/videotaped, the surgeon will obtain the original picture, image, videotape or CD.  The hospital will not be responsible for storage, release or maintenance of the picture, image, tape or CD.    7.   I consent to the presence of a  or observers in the operating room as deemed necessary by my physician or their designees.    8.   I recognize that in the event my procedure results in extended X-Ray/fluoroscopy time, I may develop a skin  reaction.    9. If I have a Do Not Attempt Resuscitation (DNAR) order in place, that status will be suspended while in the operating room, procedural suite, and during the recovery period unless otherwise explicitly stated by me (or a person authorized to consent on my behalf). The surgeon or my attending physician will determine when the applicable recovery period ends for purposes of reinstating the DNAR order.  10. Patients having a sterilization procedure: I understand that if the procedure is successful the results will be permanent and it will therefore be impossible for me to inseminate, conceive, or bear children.  I also understand that the procedure is intended to result in sterility, although the result has not been guaranteed.   11. I acknowledge that my physician has explained sedation/analgesia administration to me including the risk and benefits I consent to the administration of sedation/analgesia as may be necessary or desirable in the judgment of my physician.    I CERTIFY THAT I HAVE READ AND FULLY UNDERSTAND THE ABOVE CONSENT TO OPERATION and/or OTHER PROCEDURE.     ____________________________________  _________________________________        ______________________________  Signature of Patient    Signature of Responsible Person                Printed Name of Responsible Person                                      ____________________________________  _____________________________                ________________________________  Signature of Witness        Date  Time         Relationship to Patient    STATEMENT OF PHYSICIAN My signature below affirms that prior to the time of the procedure; I have explained to the patient and/or his/her legal representative, the risks and benefits involved in the proposed treatment and any reasonable alternative to the proposed treatment. I have also explained the risks and benefits involved in refusal of the proposed treatment and alternatives to the proposed  treatment and have answered the patient's questions. If I have a significant financial interest in a co-management agreement or a significant financial interest in any product or implant, or other significant relationship used in this procedure/surgery, I have disclosed this and had a discussion with my patient.     _____________________________________________________              _____________________________  (Signature of Physician)                                                                                         (Date)                                   (Time)  Patient Name: Delroy Walls      : 8/15/1995      Printed: 2025     Medical Record #: [id number]                                      Page 1 of 1

## (undated) NOTE — LETTER
7/14/2025          Shae Walls    [address]    [facility] [address]         Dear Shae,    Our records indicate that the tests ordered for you by EDILSON Rivera  have not been done.  If you have, in fact, already completed the tests or you do not wish to have the tests done, please contact our office at THE NUMBER LISTED BELOW.  Otherwise, please proceed with the testing.  Enclosed is a duplicate order for your convenience.    Imaging Ordered:  US GALLBLADDER (CPT=76705) (Order #[id number]) on 6/10/25     To schedule a test at any [facility] Facility, call Central Scheduling at [phone], Monday through Friday between 7:30am to 6pm and on Saturday between 8am and 1pm.   Evening and weekend appointments for your exam are available.Please schedule your test order through my Chart as for your preference.      Sincerely,    EDILSON Rivera  [facility]  [address]  [facility] [facility] [place] [id number] [phone]